# Patient Record
Sex: FEMALE | Race: BLACK OR AFRICAN AMERICAN | NOT HISPANIC OR LATINO | Employment: OTHER | URBAN - METROPOLITAN AREA
[De-identification: names, ages, dates, MRNs, and addresses within clinical notes are randomized per-mention and may not be internally consistent; named-entity substitution may affect disease eponyms.]

---

## 2017-01-28 ENCOUNTER — HOSPITAL ENCOUNTER (EMERGENCY)
Facility: HOSPITAL | Age: 47
Discharge: HOME | End: 2017-01-28
Attending: EMERGENCY MEDICINE
Payer: MEDICAID

## 2017-01-28 VITALS
SYSTOLIC BLOOD PRESSURE: 130 MMHG | DIASTOLIC BLOOD PRESSURE: 81 MMHG | HEIGHT: 69 IN | RESPIRATION RATE: 18 BRPM | OXYGEN SATURATION: 98 % | HEART RATE: 86 BPM | BODY MASS INDEX: 33.47 KG/M2 | WEIGHT: 226 LBS | TEMPERATURE: 98 F

## 2017-01-28 DIAGNOSIS — F32.A DEPRESSION, UNSPECIFIED DEPRESSION TYPE: ICD-10-CM

## 2017-01-28 DIAGNOSIS — R03.0 ELEVATED BLOOD PRESSURE READING: ICD-10-CM

## 2017-01-28 DIAGNOSIS — K08.89 PAIN, DENTAL: Primary | ICD-10-CM

## 2017-01-28 PROCEDURE — 99283 EMERGENCY DEPT VISIT LOW MDM: CPT

## 2017-01-28 RX ORDER — AMITRIPTYLINE HYDROCHLORIDE 100 MG/1
100 TABLET ORAL NIGHTLY
Qty: 30 TABLET | Refills: 0 | Status: SHIPPED | OUTPATIENT
Start: 2017-01-28 | End: 2017-05-26

## 2017-01-28 RX ORDER — HYDROCODONE BITARTRATE AND ACETAMINOPHEN 7.5; 325 MG/1; MG/1
1 TABLET ORAL EVERY 6 HOURS PRN
Qty: 16 TABLET | Refills: 0 | Status: SHIPPED | OUTPATIENT
Start: 2017-01-28 | End: 2017-04-22 | Stop reason: CLARIF

## 2017-01-28 NOTE — ED PROVIDER NOTES
"SCRIBE #1 NOTE: I, Anna Naqvi, am scribing for, and in the presence of, Teressa Castano DO. I have scribed the entire note.      History      Chief Complaint   Patient presents with    Dental Pain     pt states she had a tooth pulled this week and she feels like the pain has gotten worse and the infection has not improved since being on ABX       Review of patient's allergies indicates:   Allergen Reactions    Macrobid [nitrofurantoin monohyd/m-cryst] Hives        HPI   HPI    2017, 7:22 AM   History obtained from the patient      History of Present Illness: Sonali Harrison is a 46 y.o. female patient who presents to the Emergency Department for dental pain which onset suddenly after having a tooth pulled this week. Pt reports being given penicillin and Norco, for pain. Pt says she is out of her Norco. Symptoms are constant and moderate in severity. Pt currently rates the pain as a 9/10. The patient describes the sxs as a "throbbing" pain. No mitigating or exacerbating factors reported. No associated sxs reported. Patient denies any facial swelling, swelling of the throat, trouble swallowing chest pain, fever, chills, n/v/d, and all other sxs at this time. No other prior Tx reported. No further complaints or concerns at this time.         Arrival mode:Personal vehicle     PCP: Primary Doctor No       Past Medical History:  Past Medical History   Diagnosis Date    Arthritis     Depression     Diabetes mellitus     GERD (gastroesophageal reflux disease)     Hypertension     Insomnia     Panic attack        Past Surgical History:  Past Surgical History   Procedure Laterality Date    Hysterectomy       section           Family History:  Family History   Problem Relation Age of Onset    Birth defects Neg Hx        Social History:  Social History     Social History Main Topics    Smoking status: Current Every Day Smoker     Packs/day: 0.25     Types: Cigarettes    Smokeless " tobacco: Never Used    Alcohol use No    Drug use: No    Sexual activity: Unknown       ROS   Review of Systems   Constitutional: Negative for chills and fever.   HENT: Positive for dental problem (pain). Negative for facial swelling, sore throat and trouble swallowing.         (-) pharyngeal swelling   Respiratory: Negative for shortness of breath.    Cardiovascular: Negative for chest pain.   Gastrointestinal: Negative for diarrhea, nausea and vomiting.   Genitourinary: Negative for dysuria.   Musculoskeletal: Negative for back pain.   Skin: Negative for rash.   Neurological: Negative for weakness.   Hematological: Does not bruise/bleed easily.   All other systems reviewed and are negative.      Physical Exam    Initial Vitals   BP Pulse Resp Temp SpO2   01/28/17 0709 01/28/17 0709 01/28/17 0709 01/28/17 0709 01/28/17 0709   130/81 86 18 98.2 °F (36.8 °C) 98 %      Physical Exam  Nursing Notes and Vital Signs Reviewed.  Constitutional: Patient is in no acute distress. Awake and alert. Well-developed and well-nourished.  Head: Atraumatic. Normocephalic.  Eyes: PERRL. EOM intact. Conjunctivae are not pale. No scleral icterus.  ENT: Mucous membranes are moist. Oropharynx is clear and symmetric.  Mouth/Throat: No evident facial swelling. Healing gum noted to lower left second molar. Left upper molar TTP with erythema. No palpable fluctuance. No evidence of periodontal or periapical abscess. No trismus.     Neck: Supple. Full ROM. No lymphadenopathy.  Cardiovascular: Regular rate. Regular rhythm. No murmurs, rubs, or gallops. Distal pulses are 2+ and symmetric.  Pulmonary/Chest: No respiratory distress. Clear to auscultation bilaterally. No wheezing, rales, or rhonchi.  Abdominal: Soft and non-distended.  There is no tenderness.  No rebound, guarding, or rigidity.    Musculoskeletal: Moves all extremities. No obvious deformities. No edema. No calf tenderness.  Skin: Warm and dry.  Neurological:  Alert, awake, and  "appropriate.  Normal speech.  No acute focal neurological deficits are appreciated.  Psychiatric: Normal affect. Good eye contact. Appropriate in content.    ED Course    Procedures  ED Vital Signs:  Vitals:    01/28/17 0709   BP: 130/81   Pulse: 86   Resp: 18   Temp: 98.2 °F (36.8 °C)   TempSrc: Oral   SpO2: 98%   Weight: 102.5 kg (226 lb)   Height: 5' 9" (1.753 m)                The Emergency Provider reviewed the vital signs and test results, which are outlined above.    ED Discussion     7:32 AM: Initial assessment.  Pt is awake, alert, and in NAD at this time. Discussed with pt all pertinent ED information and results. Discussed pt dx and plan of tx. Gave pt all f/u and return to the ED instructions. All questions and concerns were addressed at this time. Pt expresses understanding of information and instructions, and is comfortable with plan to discharge. Pt is stable for discharge.    Pre-hypertension/Hypertension: The pt has been informed that they may have pre-hypertension or hypertension based on a blood pressure reading in the ED. I recommend that the pt call the PCP listed on their discharge instructions or a physician of their choice this week to arrange f/u for further evaluation of possible pre-hypertension or hypertension.       ED Medication(s):  Medications - No data to display    Discharge Medication List as of 1/28/2017  7:30 AM      START taking these medications    Details   hydrocodone-acetaminophen 7.5-325mg (NORCO) 7.5-325 mg per tablet Take 1 tablet by mouth every 6 (six) hours as needed for Pain., Starting 1/28/2017, Until Discontinued, Print             Follow-up Information     Follow up with Primary Care Provider of Choice. Schedule an appointment as soon as possible for a visit in 2 days.    Why:  For elevated blood pressure screening.  Return to emergency department for weakness, chest pain, chest pressure, difficult to breathing, or other concerns.    Contact information:    " [phone] for appointment.         Follow up with Your Dentist. Schedule an appointment as soon as possible for a visit in 2 days.    Why:  Return to the ED for:   throat swelling, facial swelling, difficulty breathing.             Medical Decision Making              Scribe Attestation:   Scribe #1: I performed the above scribed service and the documentation accurately describes the services I performed. I attest to the accuracy of the note.    Attending:   Physician Attestation Statement for Scribe #1: I, Teressa Castano DO, personally performed the services described in this documentation, as scribed by Anna Naqvi, in my presence, and it is both accurate and complete.          Clinical Impression       ICD-10-CM ICD-9-CM   1. Pain, dental K08.89 525.9   2. Elevated blood pressure reading R03.0 796.2   3. Depression, unspecified depression type F32.9 311       Disposition:   Disposition: Discharged  Condition: Stable         Teressa Castano DO  01/28/17 1921

## 2017-01-28 NOTE — ED AVS SNAPSHOT
[facility]  [address] [address] [address]               Sonali Harrison   2017  7:03 AM   ED    Description:  Female : 1970   Department:  [facility]            Your Care was Coordinated By:     Provider Role From To    Teressa Castano DO Attending Provider 17 0710 --      Reason for Visit     Dental Pain           Diagnoses this Visit        Comments    Pain, dental    -  Primary     Elevated blood pressure reading           ED Disposition     None           To Do List           Follow-up Information     Follow up with Primary Care Provider of Choice. Schedule an appointment as soon as possible for a visit in 2 days.    Why:  For elevated blood pressure screening.  Return to emergency department for weakness, chest pain, chest pressure, difficult to breathing, or other concerns.    Contact information:    [phone] for appointment.         Follow up with Your Dentist. Schedule an appointment as soon as possible for a visit in 2 days.    Why:  Return to the ED for:   throat swelling, facial swelling, difficulty breathing.        These Medications        Disp Refills Start End    hydrocodone-acetaminophen 7.5-325mg (NORCO) 7.5-325 mg per tablet 16 tablet 0 2017     Take 1 tablet by mouth every 6 (six) hours as needed for Pain. - Oral      [facility]     [facility] -  Assistance  Registered nurses in the [facility] provide clinical advisement, health education, appointment booking, and other advisory services.  Call for this free service at [phone].             Medications           Message regarding Medications     Verify the changes and/or additions to your medication regime listed below are the same as discussed with your clinician today.  If any of these changes or additions are incorrect, please notify your healthcare provider.        START taking these  NEW medications        Refills    hydrocodone-acetaminophen 7.5-325mg (NORCO) 7.5-325 mg per tablet 0    Sig: Take 1 tablet by mouth every 6 (six) hours as needed for Pain.    Class: Print    Route: Oral      STOP taking these medications     naproxen (NAPROSYN) 500 MG tablet Take 1 tablet (500 mg total) by mouth 2 (two) times daily with meals.    hydrocodone-acetaminophen 5-325mg (NORCO) 5-325 mg per tablet Take 1 tablet by mouth every 4 (four) hours as needed for Pain.    acetaminophen-codeine 300-30mg (TYLENOL #3) 300-30 mg Tab Take 1-2 tablets by mouth every 6 (six) hours as needed.    diclofenac (VOLTAREN) 50 MG EC tablet Take 1 tablet (50 mg total) by mouth 3 (three) times daily as needed.    etodolac (LODINE) 400 MG tablet Take 1 tablet (400 mg total) by mouth 2 (two) times daily.           Verify that the below list of medications is an accurate representation of the medications you are currently taking.  If none reported, the list may be blank. If incorrect, please contact your healthcare provider. Carry this list with you in case of emergency.           Current Medications     amitriptyline (ELAVIL) 100 MG tablet Take 1 tablet (100 mg total) by mouth every evening.    clonazePAM (KLONOPIN) 1 MG tablet Take 1 tablet (1 mg total) by mouth 2 (two) times daily as needed for Anxiety.    fluticasone (FLONASE) 50 mcg/actuation nasal spray 1 spray by Each Nare route 2 (two) times daily as needed.    hydrocodone-acetaminophen 7.5-325mg (NORCO) 7.5-325 mg per tablet Take 1 tablet by mouth every 6 (six) hours as needed for Pain.    meloxicam (MOBIC) 15 MG tablet Take 1 tablet (15 mg total) by mouth once daily.    metformin (GLUCOPHAGE) 500 MG tablet Take 1 tablet (500 mg total) by mouth daily with breakfast.    omeprazole (PRILOSEC) 20 MG capsule Take 1 capsule (20 mg total) by mouth once daily.           Clinical Reference Information           Your Vitals Were     BP Pulse Temp Resp Height Weight    130/81 (BP  "Location: Right arm, Patient Position: Sitting) 86 98.2 °F (36.8 °C) (Oral) 18 5' 9" (1.753 m) 102.5 kg (226 lb)    SpO2 BMI             98% 33.37 kg/m2         Allergies as of 1/28/2017        Reactions    Macrobid [Nitrofurantoin Monohyd/m-cryst] Hives      Immunizations Administered on Date of Encounter - 1/28/2017     None      ED Micro, Lab, POCT     None      ED Imaging Orders     None        Discharge Instructions       Your blood pressure was elevated in the ED.  You may have high blood pressure.   Keep a log of your blood pressure and follow up with a Primary Care Provider within the next two weeks. Call [phone] for appointment.       Discharge References/Attachments     HYPERTENSION, TO BE CONFIRMED (ENGLISH)    DENTAL PAIN (ENGLISH)    HYDROCODONE BITARTRATE, ACETAMINOPHEN ORAL TABLET (ENGLISH)      [facility] Sign-Up     Activating your [facility] account is as easy as 1-2-3!     1) Visit [email or web address], select Sign Up Now, enter this activation code and your date of birth, then select Next.  [email or web address]  Expires: 3/14/2017  7:30 AM      2) Create a username and password to use when you visit [facility] in the future and select a security question in case you lose your password and select Next.    3) Enter your e-mail address and click Sign Up!    Additional Information  If you have questions, please e-mail [email or web address] or call [phone] to talk to our [facility] staff. Remember, [facility] is NOT to be used for urgent needs. For medical emergencies, dial 911.         Smoking Cessation     If you would like to quit smoking:   You may be eligible for free services if you are a [place] resident and started smoking cigarettes before September 1, 1988.  Call the [employer]oking [employer]essation [employer] ([employer]) toll free at (347) 562-1164 or (083) 146-5237.   Call 4-272-QUIT-NOW if you do not meet the above criteria.             [facility]  complies with applicable Federal civil rights laws " and does not discriminate on the basis of race, color, national origin, age, disability, or sex.        Language Assistance Services     ATTENTION: Language assistance services are available, free of charge. Please call [phone].      ATENCIÓN: Si habla obey, tiene a mantilla disposición servicios gratuitos de asistencia lingüística. Llame al [phone].     CHÚ Ý: N?u b?n nói Ti?ng Vi?t, có các d?ch v? h? tr? ngôn ng? mi?n phí dành cho b?n. G?i s? [phone].

## 2017-01-28 NOTE — DISCHARGE INSTRUCTIONS
Your blood pressure was elevated in the ED.  You may have high blood pressure.   Keep a log of your blood pressure and follow up with a Primary Care Provider within the next two weeks. Call [phone] for appointment.

## 2017-01-28 NOTE — ED NOTES
Bed: RWR 02  Expected date:   Expected time:   Means of arrival:   Comments:     Teressa Castano,   01/28/17 0731

## 2017-04-22 ENCOUNTER — HOSPITAL ENCOUNTER (EMERGENCY)
Facility: HOSPITAL | Age: 47
Discharge: HOME | End: 2017-04-22
Payer: MEDICAID

## 2017-04-22 VITALS
RESPIRATION RATE: 16 BRPM | HEIGHT: 70 IN | DIASTOLIC BLOOD PRESSURE: 68 MMHG | WEIGHT: 188 LBS | BODY MASS INDEX: 26.92 KG/M2 | OXYGEN SATURATION: 99 % | TEMPERATURE: 98 F | HEART RATE: 75 BPM | SYSTOLIC BLOOD PRESSURE: 117 MMHG

## 2017-04-22 DIAGNOSIS — M27.3 DRY SOCKET: ICD-10-CM

## 2017-04-22 DIAGNOSIS — K08.89 PAIN, DENTAL: Primary | ICD-10-CM

## 2017-04-22 PROCEDURE — 99283 EMERGENCY DEPT VISIT LOW MDM: CPT

## 2017-04-22 RX ORDER — HYDROCODONE BITARTRATE AND ACETAMINOPHEN 10; 325 MG/1; MG/1
1 TABLET ORAL EVERY 6 HOURS PRN
Qty: 10 TABLET | Refills: 0 | Status: SHIPPED | OUTPATIENT
Start: 2017-04-22 | End: 2017-05-26 | Stop reason: SDUPTHER

## 2017-04-22 RX ORDER — IBUPROFEN 600 MG/1
600 TABLET ORAL EVERY 6 HOURS PRN
Qty: 10 TABLET | Refills: 0 | Status: SHIPPED | OUTPATIENT
Start: 2017-04-22 | End: 2017-06-26

## 2017-04-22 NOTE — DISCHARGE INSTRUCTIONS
Follow up with dentist as discussed. See if you can be seen on Monday.   Pain medication as prescribed. Do not take anything over the counter for pain while taking these medications.         Dry Socket    Dry socket occurs after a tooth is removed (extracted). After a tooth is removed, a blood clot forms in the space where the tooth was. This clot protects the underlying bone until the gum has healed. Dry socket occurs when the blood clot dissolves or falls out too soon, exposing the bone and nerves. This may cause severe pain, which can extend to the jaw or other parts of the face and head. Symptoms usually occur 1 to 3 days after surgery. Dry socket is more likely if the extraction was difficult. Infection also makes dry socket more likely. Smoking or taking birth control pills can also increase the risk.  Home care  Medicines: The healthcare provider may prescribe medicine for pain or infection. Follow the healthcare providers instructions when using these medicines. If you are given medicine for infection, take it exactly as directed. Do not stop taking it until you are told to.  General care  · If the socket was packed with gauze, follow the healthcare providers instructions to care for it. Follow up with your oral surgeon or dentist to have the gauze changed.  · Rinse your mouth with saltwater or a prescribed mouthwash a few times a day. This flushes food particles from the socket. You may be given a syringe to help flush the socket. If this is the case, follow the healthcare providers instructions closely.  · Place a cold pack wrapped in a thin towel on your jaw over the sore area for 10 minutes at a time to help reduce pain and swelling.  · Avoid drinking from a straw. This can make the pain worse.  · Avoid foods that are hard and may poke the socket. Avoid hot or cold food and drinks until the socket heals.  Follow-up care  Follow up as directed with your oral surgeon or dentist. Often, your surgeon or  dentist will pack the socket until healing is complete. Your pain may go away with the treatment given, but only an oral surgeon or dentist can fully evaluate and treat your dry socket.  · If a culture was done, you will be notified if the treatment needs to be changed. You can call as directed for the results.  · If X-rays were done, they will be reviewed. You will be notified if there is a change in the reading, especially if it affects treatment.  Call 911  Call emergency services right away if any of these occur:  · Trouble breathing or swallowing, wheezing  · Hoarse voice or trouble speaking  · Confused  · Extreme drowsiness or trouble awakening  · Fainting or loss of consciousness  · Rapid heart rate  When to seek medical advice  Call your healthcare provider right away if any of these occur:  · Increased swelling and redness of the face  · Pain that worsens or spreads to the neck or ear  · Fever of 100.4°F (38°C) or higher  · Unusual drowsiness, headache or stiff neck, weakness  · Pus draining from the tooth socket  · Bleeding that is severe or wont stop with pressure on the area.  · Unpleasant smell and taste in your mouth  Date Last Reviewed: 7/30/2015  © 4489-4119 [employer]. [address] [place] [address], [place] [address]. All rights reserved. This information is not intended as a substitute for professional medical care. Always follow your healthcare professional's instructions.

## 2017-04-22 NOTE — ED AVS SNAPSHOT
[facility]  [address] [facility] [address]               Sonali Harrison   2017  8:39 AM   ED    Description:  Female : 1970   Department:  [facility]            Your Care was Coordinated By:     Provider Role From To    Nasima Contreras PA-C Physician Assistant 17 0839 17 0916    Nasima Contreras PA-C Physician Assistant 17 0920 --      Reason for Visit     Dental Pain           Diagnoses this Visit        Comments    Pain, dental    -  Primary     Dry socket           ED Disposition     ED Disposition Condition Comment    Discharge             To Do List           Follow-up Information     Follow up with [facility] In 2 days.    Contact information:    [facility] [facility] [address] [phone]         These Medications        Disp Refills Start End    hydrocodone-acetaminophen 10-325mg (NORCO)  mg Tab 10 tablet 0 2017     Take 1 tablet by mouth every 6 (six) hours as needed for Pain. - Oral    ibuprofen (ADVIL,MOTRIN) 600 MG tablet 10 tablet 0 2017     Take 1 tablet (600 mg total) by mouth every 6 (six) hours as needed for Pain. - Oral      [facility]     [facility] - 24/ Assistance  Unless otherwise directed by your provider, please contact [facility], our nurse care line that is available for 24/7 assistance.     Registered nurses in the [facility] provide: appointment scheduling, clinical advisement, health education, and other advisory services.  Call: [phone] (toll free)               Medications           Message regarding Medications     Verify the changes and/or additions to your medication regime listed below are the same as discussed with your clinician today.  If any of these changes or additions are incorrect, please notify your healthcare provider.        START taking these NEW  "medications        Refills    hydrocodone-acetaminophen 10-325mg (NORCO)  mg Tab 0    Sig: Take 1 tablet by mouth every 6 (six) hours as needed for Pain.    Class: Print    Route: Oral    ibuprofen (ADVIL,MOTRIN) 600 MG tablet 0    Sig: Take 1 tablet (600 mg total) by mouth every 6 (six) hours as needed for Pain.    Class: Print    Route: Oral      STOP taking these medications     hydrocodone-acetaminophen 7.5-325mg (NORCO) 7.5-325 mg per tablet Take 1 tablet by mouth every 6 (six) hours as needed for Pain.           Verify that the below list of medications is an accurate representation of the medications you are currently taking.  If none reported, the list may be blank. If incorrect, please contact your healthcare provider. Carry this list with you in case of emergency.           Current Medications     amitriptyline (ELAVIL) 100 MG tablet Take 1 tablet (100 mg total) by mouth every evening.    clonazePAM (KLONOPIN) 1 MG tablet Take 1 tablet (1 mg total) by mouth 2 (two) times daily as needed for Anxiety.    fluticasone (FLONASE) 50 mcg/actuation nasal spray 1 spray by Each Nare route 2 (two) times daily as needed.    hydrocodone-acetaminophen 10-325mg (NORCO)  mg Tab Take 1 tablet by mouth every 6 (six) hours as needed for Pain.    ibuprofen (ADVIL,MOTRIN) 600 MG tablet Take 1 tablet (600 mg total) by mouth every 6 (six) hours as needed for Pain.    meloxicam (MOBIC) 15 MG tablet Take 1 tablet (15 mg total) by mouth once daily.    metformin (GLUCOPHAGE) 500 MG tablet Take 1 tablet (500 mg total) by mouth daily with breakfast.    omeprazole (PRILOSEC) 20 MG capsule Take 1 capsule (20 mg total) by mouth once daily.           Clinical Reference Information           Your Vitals Were     BP Pulse Temp Resp Height Weight    117/68 (BP Location: Right arm, Patient Position: Sitting) 75 97.9 °F (36.6 °C) (Oral) 16 5' 10" (1.778 m) 85.3 kg (188 lb)    SpO2 BMI             99% 26.98 kg/m2         Allergies " as of 4/22/2017        Reactions    Macrobid [Nitrofurantoin Monohyd/m-cryst] Hives      Immunizations Administered on Date of Encounter - 4/22/2017     None      ED Micro, Lab, POCT     None      ED Imaging Orders     None        Discharge Instructions       Follow up with dentist as discussed. See if you can be seen on Monday.   Pain medication as prescribed. Do not take anything over the counter for pain while taking these medications.         Dry Socket    Dry socket occurs after a tooth is removed (extracted). After a tooth is removed, a blood clot forms in the space where the tooth was. This clot protects the underlying bone until the gum has healed. Dry socket occurs when the blood clot dissolves or falls out too soon, exposing the bone and nerves. This may cause severe pain, which can extend to the jaw or other parts of the face and head. Symptoms usually occur 1 to 3 days after surgery. Dry socket is more likely if the extraction was difficult. Infection also makes dry socket more likely. Smoking or taking birth control pills can also increase the risk.  Home care  Medicines: The healthcare provider may prescribe medicine for pain or infection. Follow the healthcare providers instructions when using these medicines. If you are given medicine for infection, take it exactly as directed. Do not stop taking it until you are told to.  General care  · If the socket was packed with gauze, follow the healthcare providers instructions to care for it. Follow up with your oral surgeon or dentist to have the gauze changed.  · Rinse your mouth with saltwater or a prescribed mouthwash a few times a day. This flushes food particles from the socket. You may be given a syringe to help flush the socket. If this is the case, follow the healthcare providers instructions closely.  · Place a cold pack wrapped in a thin towel on your jaw over the sore area for 10 minutes at a time to help reduce pain and swelling.  · Avoid  drinking from a straw. This can make the pain worse.  · Avoid foods that are hard and may poke the socket. Avoid hot or cold food and drinks until the socket heals.  Follow-up care  Follow up as directed with your oral surgeon or dentist. Often, your surgeon or dentist will pack the socket until healing is complete. Your pain may go away with the treatment given, but only an oral surgeon or dentist can fully evaluate and treat your dry socket.  · If a culture was done, you will be notified if the treatment needs to be changed. You can call as directed for the results.  · If X-rays were done, they will be reviewed. You will be notified if there is a change in the reading, especially if it affects treatment.  Call 911  Call emergency services right away if any of these occur:  · Trouble breathing or swallowing, wheezing  · Hoarse voice or trouble speaking  · Confused  · Extreme drowsiness or trouble awakening  · Fainting or loss of consciousness  · Rapid heart rate  When to seek medical advice  Call your healthcare provider right away if any of these occur:  · Increased swelling and redness of the face  · Pain that worsens or spreads to the neck or ear  · Fever of 100.4°F (38°C) or higher  · Unusual drowsiness, headache or stiff neck, weakness  · Pus draining from the tooth socket  · Bleeding that is severe or wont stop with pressure on the area.  · Unpleasant smell and taste in your mouth  Date Last Reviewed: 7/30/2015  © 7114-4926 [employer]. [address] [place] [address]. All rights reserved. This information is not intended as a substitute for professional medical care. Always follow your healthcare professional's instructions.          [facility] Sign-Up     Activating your [facility] account is as easy as 1-2-3!     1) Visit [email or web address], select Sign Up Now, enter this activation code and your date of birth, then select Next.  [email or web address]  Expires: 6/6/2017  9:34 AM      2)  Create a username and password to use when you visit [facility] in the future and select a security question in case you lose your password and select Next.    3) Enter your e-mail address and click Sign Up!    Additional Information  If you have questions, please e-mail [email or web address] or call [phone] to talk to our [employer] staff. Remember, [facility] is NOT to be used for urgent needs. For medical emergencies, dial 911.          [facility] complies with applicable Federal civil rights laws and does not discriminate on the basis of race, color, national origin, age, disability, or sex.        Language Assistance Services     ATTENTION: Language assistance services are available, free of charge. Please call [phone].      ATENCIÓN: Si habla español, tiene a mantilla disposición servicios gratuitos de asistencia lingüística. Llame al [phone].     CHÚ Ý: N?u b?n nói Ti?ng Vi?t, có các d?ch v? h? tr? ngôn ng? mi?n phí dành cho b?n. G?i s? [phone].

## 2017-04-22 NOTE — ED PROVIDER NOTES
Encounter Date: 2017       History     Chief Complaint   Patient presents with    Dental Pain     pt had tooth pulled on 17, pt reports still has pain, also reports sore throat.     Review of patient's allergies indicates:   Allergen Reactions    Macrobid [nitrofurantoin monohyd/m-cryst] Hives     HPI Comments: Patient reports pain to area where tooth was pulled on Monday. Patient was seen at [facility]. She tried to go back Friday but they don't take walkins on . She has another appointment with them in May for another tooth to be pulled. She comes in today for throbbing pain to area. States it feels like the last time when she had a dry socket. She is already on PCN and Norco 7.5 without relief of complaints. No facial swelling or fever. Patient states she is going to go by Monday to see if she can see the dentist again but can't take the pain until that time. Difficult to sleep due to pain.      Past Medical History:   Diagnosis Date    Arthritis     Depression     Diabetes mellitus     GERD (gastroesophageal reflux disease)     Hypertension     Insomnia     Panic attack      Past Surgical History:   Procedure Laterality Date     SECTION      HYSTERECTOMY       Family History   Problem Relation Age of Onset    Birth defects Neg Hx      Social History   Substance Use Topics    Smoking status: Current Every Day Smoker     Packs/day: 0.25     Types: Cigarettes    Smokeless tobacco: Never Used    Alcohol use No     Review of Systems   Constitutional: Negative.    HENT: Positive for dental problem. Negative for facial swelling.    Respiratory: Negative.    Cardiovascular: Negative.    Genitourinary: Negative.    All other systems reviewed and are negative.      Physical Exam   Initial Vitals   BP Pulse Resp Temp SpO2   17 0826 17 0826 17 0826 17 0826 17 0826   117/68 75 16 97.9 °F (36.6 °C) 99 %     Physical Exam    Nursing note and vitals  reviewed.  Constitutional: She appears well-developed.   Patient tearful. Appears tired and to be in pain.     HENT:   Head: Normocephalic and atraumatic.   S/P removal of tooth #28 - gumline not erythematous. No drainage.  Area very sensitive to touch.  No associated facial swelling.     Neck: Neck supple.   Cardiovascular: Normal rate and regular rhythm.   Pulmonary/Chest: Breath sounds normal. No respiratory distress.   Lymphadenopathy:     She has no cervical adenopathy.   Neurological: She is alert and oriented to person, place, and time.   Skin: Skin is warm and dry. No rash noted.   Psychiatric:   Tearful but consolable.           ED Course   Procedures  Labs Reviewed - No data to display                       Attending Attestation:     Physician Attestation Statement for NP/PA:   I discussed this assessment and plan of this patient with the NP/PA, but I did not personally examine the patient. The face to face encounter was performed by the NP/PA.                  ED Course     Clinical Impression:   The primary encounter diagnosis was Pain, dental. A diagnosis of Dry socket was also pertinent to this visit.    Disposition:   Disposition: Discharged  Condition: Stable    Advised patient to try to follow up with dentist on Monday. No other otc medications for pain while taking prescriptions. Continue penicillin. Re-evaluation with facial swelling, fever, drainage, worsening complaints.          Nasima Contreras PA-C  04/22/17 4715       Rj Merlos MD  04/22/17 4276

## 2017-05-26 ENCOUNTER — HOSPITAL ENCOUNTER (EMERGENCY)
Facility: HOSPITAL | Age: 47
Discharge: HOME | End: 2017-05-26
Attending: EMERGENCY MEDICINE
Payer: MEDICAID

## 2017-05-26 VITALS
HEIGHT: 71 IN | SYSTOLIC BLOOD PRESSURE: 137 MMHG | DIASTOLIC BLOOD PRESSURE: 69 MMHG | OXYGEN SATURATION: 99 % | BODY MASS INDEX: 27.16 KG/M2 | WEIGHT: 194 LBS | HEART RATE: 73 BPM | RESPIRATION RATE: 18 BRPM | TEMPERATURE: 98 F

## 2017-05-26 DIAGNOSIS — K04.7 DENTAL ABSCESS: ICD-10-CM

## 2017-05-26 DIAGNOSIS — F41.9 ANXIETY: ICD-10-CM

## 2017-05-26 DIAGNOSIS — K08.89 TOOTHACHE: Primary | ICD-10-CM

## 2017-05-26 PROCEDURE — 99283 EMERGENCY DEPT VISIT LOW MDM: CPT

## 2017-05-26 RX ORDER — CLINDAMYCIN HYDROCHLORIDE 150 MG/1
450 CAPSULE ORAL EVERY 8 HOURS
Qty: 45 CAPSULE | Refills: 0 | Status: SHIPPED | OUTPATIENT
Start: 2017-05-26 | End: 2017-05-31

## 2017-05-26 RX ORDER — CLONAZEPAM 1 MG/1
1 TABLET ORAL 2 TIMES DAILY PRN
Qty: 15 TABLET | Refills: 0 | Status: SHIPPED | OUTPATIENT
Start: 2017-05-26 | End: 2017-06-13 | Stop reason: SDUPTHER

## 2017-05-26 RX ORDER — AMITRIPTYLINE HYDROCHLORIDE 100 MG/1
100 TABLET ORAL NIGHTLY
Qty: 30 TABLET | Refills: 0 | Status: SHIPPED | OUTPATIENT
Start: 2017-05-26 | End: 2024-01-31

## 2017-05-26 RX ORDER — HYDROCODONE BITARTRATE AND ACETAMINOPHEN 5; 325 MG/1; MG/1
1 TABLET ORAL EVERY 4 HOURS PRN
Qty: 11 TABLET | Refills: 0 | Status: SHIPPED | OUTPATIENT
Start: 2017-05-26 | End: 2017-06-05

## 2017-05-26 NOTE — ED PROVIDER NOTES
SCRIBE #1 NOTE: I, Jonathan Sharp, am scribing for, and in the presence of, Frank Contreras MD. I have scribed the entire note.      History      Chief Complaint   Patient presents with    Joint Pain     hx of arthritis (pain to arms and legs)    Dental Pain       Review of patient's allergies indicates:   Allergen Reactions    Macrobid [nitrofurantoin monohyd/m-cryst] Hives        HPI   HPI    2017, 9:53 AM   History obtained from the patient      History of Present Illness: Sonali Harrison is a 47 y.o. female patient who presents to the Emergency Department for generalized arthralgias which onset gradually a few days ago. Pt states she has arthritis. Symptoms are intermittent and moderate in severity. Sx are exacerbated by nothing and relieved by nothing. Associated sxs include L upper gum pain. Pt reports having a tooth pulled 7 days ago and reports seeing her dentist 3 days ago who told her she had a dry socket and was placed on penicillin. No other sxs reported. Pt is also requesting a refill on her klonopin. Patient denies any fever, N/V/D, chills, abd pain, drooling, facial swelling, difficulty swallowing, rhinorrhea, congestion, cough, joint swelling, recent injury/trauma, ecchymosis, erythema and all other sxs at this time. No further complaints or concerns at this time.     Arrival mode: Personal vehicle     PCP: Primary Doctor No       Past Medical History:  Past Medical History:   Diagnosis Date    Arthritis     Depression     Diabetes mellitus     GERD (gastroesophageal reflux disease)     Hypertension     Insomnia     Panic attack        Past Surgical History:  Past Surgical History:   Procedure Laterality Date     SECTION      HYSTERECTOMY           Family History:  Family History   Problem Relation Age of Onset    Birth defects Neg Hx        Social History:  Social History     Social History Main Topics    Smoking status: Current Every Day Smoker     Packs/day:  0.25     Types: Cigarettes    Smokeless tobacco: Never Used    Alcohol use No    Drug use: No    Sexual activity: Unknown       ROS   Review of Systems   Constitutional: Negative for chills and fever.   HENT: Positive for dental problem (gum pain). Negative for congestion, drooling, facial swelling, rhinorrhea, sinus pressure, sore throat and trouble swallowing.    Respiratory: Negative for cough, chest tightness and shortness of breath.    Cardiovascular: Negative for chest pain.   Gastrointestinal: Negative for abdominal pain, nausea and vomiting.   Musculoskeletal: Positive for arthralgias (generalized). Negative for back pain, joint swelling and neck pain.   Skin: Negative for rash.   Neurological: Negative for dizziness, numbness and headaches.   Psychiatric/Behavioral: Negative for agitation and confusion.   All other systems reviewed and are negative.      Physical Exam      Initial Vitals [05/26/17 0944]   BP Pulse Resp Temp SpO2   137/69 73 18 98 °F (36.7 °C) 99 %      Physical Exam  Nursing Notes and Vital Signs Reviewed.  Constitutional: Patient is in no apparent distress. Well-developed and well-nourished.  Head: Atraumatic. Normocephalic.  Eyes: PERRL. EOM intact. Conjunctivae are not pale. No scleral icterus.  ENT: Mucous membranes are moist. Oropharynx is clear and symmetric. Poor dentition, multiple missing teeth noted. Tooth number 9 has gum swelling and erythema.   Neck: Supple. Full ROM. No lymphadenopathy.  Cardiovascular: Regular rate. Regular rhythm. No murmurs, rubs, or gallops. Distal pulses are 2+ and symmetric.  Pulmonary/Chest: No respiratory distress. Clear to auscultation bilaterally. No wheezing, rales, or rhonchi.  Abdominal: Soft and non-distended.  There is no tenderness.  No rebound, guarding, or rigidity. Good bowel sounds.  Musculoskeletal: Moves all extremities. No obvious deformities. No edema. No calf tenderness.  Skin: Warm and dry.  Neurological:  Alert, awake, and  "appropriate.  Normal speech.  No acute focal neurological deficits are appreciated.  Psychiatric: Normal affect. Good eye contact. Appropriate in content.    ED Course    Procedures  ED Vital Signs:  Vitals:    05/26/17 0944   BP: 137/69   Pulse: 73   Resp: 18   Temp: 98 °F (36.7 °C)   TempSrc: Oral   SpO2: 99%   Weight: 88 kg (194 lb)   Height: 5' 11" (1.803 m)       Abnormal Lab Results:  Labs Reviewed - No data to display               The Emergency Provider reviewed the vital signs and test results, which are outlined above.    ED Discussion     9:59 AM: Pt is awake, alert, and oriented. Discussed with pt all pertinent ED information and results. Discussed pt dx and plan of tx. Gave pt all f/u and return to the ED instructions. All questions and concerns were addressed at this time. Pt expresses understanding of information and instructions, and is comfortable with plan to discharge. Pt is stable for discharge.            ED Medication(s):  Medications - No data to display    Discharge Medication List as of 5/26/2017  9:56 AM      START taking these medications    Details   clindamycin (CLEOCIN) 150 MG capsule Take 3 capsules (450 mg total) by mouth every 8 (eight) hours., Starting Fri 5/26/2017, Until Wed 5/31/2017, Print      hydrocodone-acetaminophen 5-325mg (NORCO) 5-325 mg per tablet Take 1 tablet by mouth every 4 (four) hours as needed for Pain., Starting Fri 5/26/2017, Until Mon 6/5/2017, Print             Follow-up Information     Dentist in 3 days.           [facility] in 3 days.    Contact information:  [address] [address] [phone]                     Medical Decision Making              Scribe Attestation:   Scribe #1: I performed the above scribed service and the documentation accurately describes the services I performed. I attest to the accuracy of the note.    Attending:   Physician Attestation Statement for Scribe #1: I, Frank Contreras MD, personally " performed the services described in this documentation, as scribed by Jonathan Sharp, in my presence, and it is both accurate and complete.          Clinical Impression       ICD-10-CM ICD-9-CM   1. Toothache K08.89 525.9   2. Dental abscess K04.7 522.5   3. Anxiety F41.9 300.00       Disposition:   Disposition: Discharged  Condition: Stable         Frank Contreras MD  05/26/17 1040

## 2017-06-13 ENCOUNTER — HOSPITAL ENCOUNTER (EMERGENCY)
Facility: HOSPITAL | Age: 47
Discharge: HOME | End: 2017-06-13
Attending: EMERGENCY MEDICINE
Payer: MEDICAID

## 2017-06-13 VITALS
OXYGEN SATURATION: 95 % | DIASTOLIC BLOOD PRESSURE: 66 MMHG | SYSTOLIC BLOOD PRESSURE: 139 MMHG | HEART RATE: 93 BPM | WEIGHT: 195 LBS | RESPIRATION RATE: 18 BRPM | TEMPERATURE: 98 F | HEIGHT: 71 IN | BODY MASS INDEX: 27.3 KG/M2

## 2017-06-13 DIAGNOSIS — R05.9 COUGH: ICD-10-CM

## 2017-06-13 DIAGNOSIS — M25.561 ACUTE PAIN OF BOTH KNEES: Primary | ICD-10-CM

## 2017-06-13 DIAGNOSIS — M25.562 ACUTE PAIN OF BOTH KNEES: Primary | ICD-10-CM

## 2017-06-13 DIAGNOSIS — F41.9 ANXIETY: ICD-10-CM

## 2017-06-13 PROCEDURE — 99282 EMERGENCY DEPT VISIT SF MDM: CPT

## 2017-06-13 RX ORDER — TRAMADOL HYDROCHLORIDE 50 MG/1
50 TABLET ORAL EVERY 6 HOURS PRN
Qty: 12 TABLET | Refills: 0 | OUTPATIENT
Start: 2017-06-13 | End: 2019-11-11

## 2017-06-13 RX ORDER — CLONAZEPAM 1 MG/1
1 TABLET ORAL 2 TIMES DAILY PRN
Qty: 12 TABLET | Refills: 0 | Status: SHIPPED | OUTPATIENT
Start: 2017-06-13

## 2017-06-13 RX ORDER — PROMETHAZINE HYDROCHLORIDE AND DEXTROMETHORPHAN HYDROBROMIDE 6.25; 15 MG/5ML; MG/5ML
SYRUP ORAL
Qty: 118 ML | Refills: 0 | Status: SHIPPED | OUTPATIENT
Start: 2017-06-13

## 2017-06-13 NOTE — ED PROVIDER NOTES
"Encounter Date: 2017       History     Chief Complaint   Patient presents with    Medication Refill     Pt states, "I have arthritis in my knees really bad and I'm out of my arthritis medication and my anxiety medication".     Review of patient's allergies indicates:   Allergen Reactions    Macrobid [nitrofurantoin monohyd/m-cryst] Hives     47 year old female with complaint of chronic bilateral knee pain with worsening since she ran out of Lortab.  Also reports out of Klonopin for anxiety.  Also reports cough and congestion X one week. No SOB.  No fever or chills.  Pain in knees worse with walking and movement.  Mild pain relief with rest.            Past Medical History:   Diagnosis Date    Arthritis     Depression     Diabetes mellitus     GERD (gastroesophageal reflux disease)     Hypertension     Insomnia     Panic attack      Past Surgical History:   Procedure Laterality Date     SECTION      HYSTERECTOMY       Family History   Problem Relation Age of Onset    Birth defects Neg Hx      Social History   Substance Use Topics    Smoking status: Current Every Day Smoker     Packs/day: 0.25     Types: Cigarettes    Smokeless tobacco: Never Used    Alcohol use No     Review of Systems   Constitutional: Negative for fever.   HENT: Negative for sore throat.    Respiratory: Negative for shortness of breath.    Cardiovascular: Negative for chest pain.   Gastrointestinal: Negative for nausea.   Genitourinary: Negative for dysuria.   Musculoskeletal: Negative for back pain.        Knee pain    Skin: Negative for rash.   Neurological: Negative for weakness.        Anxiety   Hematological: Does not bruise/bleed easily.       Physical Exam     Initial Vitals [17 0724]   BP Pulse Resp Temp SpO2   139/66 93 18 98.3 °F (36.8 °C) 95 %     Physical Exam    Nursing note and vitals reviewed.  Constitutional: She appears well-developed and well-nourished.   HENT:   Head: Normocephalic and " atraumatic.   Eyes: Conjunctivae and EOM are normal. Pupils are equal, round, and reactive to light.   Neck: Normal range of motion. Neck supple.   Cardiovascular: Normal rate, regular rhythm, normal heart sounds and intact distal pulses.   Pulmonary/Chest: Breath sounds normal.   Abdominal: Soft. There is no tenderness. There is no rebound and no guarding.   Musculoskeletal: Normal range of motion.   No knee swelling, pain with ROM of both knees, ambulates without difficulty   Neurological: She is alert and oriented to person, place, and time. She has normal strength and normal reflexes.   Skin: Skin is warm and dry.   Psychiatric: She has a normal mood and affect. Her behavior is normal. Thought content normal.         ED Course   Procedures  Labs Reviewed - No data to display                            ED Course     Clinical Impression:   The primary encounter diagnosis was Acute pain of both knees. Diagnoses of Cough and Anxiety were also pertinent to this visit.          Jayme Lugo NP  06/13/17 0871

## 2017-06-13 NOTE — ED NOTES
Pt examined by EWELINA Lugo np without RN, educated on prescriptions, given discharge instructions and discharged to [facility].  See provider notes for exam

## 2017-06-26 ENCOUNTER — HOSPITAL ENCOUNTER (EMERGENCY)
Facility: HOSPITAL | Age: 47
Discharge: HOME | End: 2017-06-26
Payer: MEDICAID

## 2017-06-26 VITALS
DIASTOLIC BLOOD PRESSURE: 67 MMHG | BODY MASS INDEX: 27.16 KG/M2 | RESPIRATION RATE: 20 BRPM | TEMPERATURE: 98 F | WEIGHT: 194 LBS | SYSTOLIC BLOOD PRESSURE: 131 MMHG | HEART RATE: 93 BPM | HEIGHT: 71 IN | OXYGEN SATURATION: 96 %

## 2017-06-26 DIAGNOSIS — M25.562 CHRONIC PAIN OF BOTH KNEES: Primary | ICD-10-CM

## 2017-06-26 DIAGNOSIS — G89.29 CHRONIC PAIN OF BOTH KNEES: Primary | ICD-10-CM

## 2017-06-26 DIAGNOSIS — M25.561 CHRONIC PAIN OF BOTH KNEES: Primary | ICD-10-CM

## 2017-06-26 PROCEDURE — 99283 EMERGENCY DEPT VISIT LOW MDM: CPT

## 2017-06-26 RX ORDER — MELOXICAM 7.5 MG/1
7.5 TABLET ORAL DAILY
Qty: 10 TABLET | Refills: 0 | Status: SHIPPED | OUTPATIENT
Start: 2017-06-26 | End: 2018-08-03

## 2017-06-26 RX ORDER — ORPHENADRINE CITRATE 100 MG/1
100 TABLET, EXTENDED RELEASE ORAL 2 TIMES DAILY
Qty: 12 TABLET | Refills: 0 | Status: SHIPPED | OUTPATIENT
Start: 2017-06-26

## 2017-06-27 NOTE — ED PROVIDER NOTES
"SCRIBE #1 NOTE: I, Driss Antolin, am scribing for, and in the presence of, Luisana Almeida PA-C  . I have scribed the entire note.      History      Chief Complaint   Patient presents with    Knee Pain     Pt states, "My knees are swelling and in pain."       Review of patient's allergies indicates:   Allergen Reactions    Macrobid [nitrofurantoin monohyd/m-cryst] Hives        HPI   HPI    2017, 7:10 PM   History obtained from the patient      History of Present Illness: Sonali Harrison is a 47 y.o. female patient w/ PMHx Arthritis presents to the Emergency Department for chronic bilateral knee pain which onset gradually a couple of years ago. Pt states that her doctor can no longer prescribe her narcotic pain medication, and that the pain has begun to worsen. Symptoms are constant and moderate in severity.  No mitigating or exacerbating factors reported. No associated sxs reported at this time. Patient denies any fever, chills, abd pain, neck pain, gait change, and all other sxs at this time. No prior tx reported. No further complaints or concerns at this time.         Arrival mode: Personal vehicle    PCP: Ev Rivera MD       Past Medical History:  Past Medical History:   Diagnosis Date    Arthritis     Depression     Diabetes mellitus     GERD (gastroesophageal reflux disease)     Hypertension     Insomnia     Panic attack        Past Surgical History:  Past Surgical History:   Procedure Laterality Date     SECTION      HYSTERECTOMY           Family History:  Family History   Problem Relation Age of Onset    Birth defects Neg Hx        Social History:  Social History     Social History Main Topics    Smoking status: Current Every Day Smoker     Packs/day: 0.25     Types: Cigarettes    Smokeless tobacco: Never Used    Alcohol use No    Drug use: No    Sexual activity: Unknown       ROS   Review of Systems   Constitutional: Negative for chills, diaphoresis and fever. "   HENT: Negative for sore throat.    Respiratory: Negative for shortness of breath.    Cardiovascular: Negative for chest pain, palpitations and leg swelling.   Gastrointestinal: Negative for abdominal pain, blood in stool, constipation, diarrhea, nausea and vomiting.   Genitourinary: Negative for difficulty urinating, dysuria, flank pain, frequency, hematuria and urgency.   Musculoskeletal: Negative for back pain, gait problem, neck pain and neck stiffness.        + bilateral knee pain   Skin: Negative for rash.   Neurological: Negative for dizziness, syncope, speech difficulty, weakness, light-headedness, numbness and headaches.   Hematological: Does not bruise/bleed easily.   All other systems reviewed and are negative.      Physical Exam      Initial Vitals [06/26/17 1820]   BP Pulse Resp Temp SpO2   131/67 93 20 98.3 °F (36.8 °C) 96 %      MAP       88.33          Physical Exam  Nursing Notes and Vital Signs Reviewed.  Constitutional: Patient is in no apparent distress. Well-developed and well-nourished.  Head: Atraumatic. Normocephalic.  Eyes: PERRL. EOM intact. Conjunctivae are not pale. No scleral icterus.  ENT: Mucous membranes are moist. Oropharynx is clear and symmetric.    Neck: Supple. Full ROM. No lymphadenopathy.  Cardiovascular: Regular rate. Regular rhythm. No murmurs, rubs, or gallops. Distal pulses are 2+ and symmetric.  Pulmonary/Chest: No respiratory distress. Clear to auscultation bilaterally. No wheezing, rales, or rhonchi.  Abdominal: Soft and non-distended.  There is no tenderness.  No rebound, guarding, or rigidity. Good bowel sounds.  Genitourinary: No CVA tenderness  Musculoskeletal: Moves all extremities. No obvious deformities. No edema. No calf tenderness. Bilateral knee FROM. No heat or erythema to bilateral knees. Bilateral knees are non tender.   Skin: Warm and dry.  Neurological:  Alert, awake, and appropriate.  Normal speech.  No acute focal neurological deficits are  "appreciated.  Psychiatric: Normal affect. Good eye contact. Appropriate in content.    ED Course    Procedures  ED Vital Signs:  Vitals:    06/26/17 1820   BP: 131/67   Pulse: 93   Resp: 20   Temp: 98.3 °F (36.8 °C)   TempSrc: Oral   SpO2: 96%   Weight: 88 kg (194 lb)   Height: 5' 11" (1.803 m)                The Emergency Provider reviewed the vital signs and test results, which are outlined above.    ED Discussion     7:20 PM:  Discussed with pt all pertinent ED information and results. Discussed pt dx and plan of tx. Gave pt all f/u and return to the ED instructions. All questions and concerns were addressed at this time. Pt expresses understanding of information and instructions, and is comfortable with plan to discharge. Pt is stable for discharge.    I discussed with patient and/or family/caretaker that evaluation in the ED does not suggest any emergent or life threatening medical conditions requiring immediate intervention beyond what was provided in the ED, and I believe patient is safe for discharge.  Regardless, an unremarkable evaluation in the ED does not preclude the development or presence of a serious of life threatening condition. As such, patient was instructed to return immediately for any worsening or change in current symptoms.      ED Medication(s):  Medications - No data to display    Discharge Medication List as of 6/26/2017  7:16 PM      START taking these medications    Details   meloxicam (MOBIC) 7.5 MG tablet Take 1 tablet (7.5 mg total) by mouth once daily., Starting Mon 6/26/2017, Print      orphenadrine (NORFLEX) 100 mg tablet Take 1 tablet (100 mg total) by mouth 2 (two) times daily., Starting Mon 6/26/2017, Print             Follow-up Information     Ev Rivera MD.    Specialty:  Family Medicine  Contact information:  [address] [place] [address]  [address]  [facility] [id number] [phone]                     Medical Decision Making              Scribe Attestation: "   Scribe #1: I performed the above scribed service and the documentation accurately describes the services I performed. I attest to the accuracy of the note.    Attending:   Physician Attestation Statement for Scribe #1: I, Luisana Almeida PA-C, personally performed the services described in this documentation, as scribed by Driss Dangelo, in my presence, and it is both accurate and complete.          Clinical Impression       ICD-10-CM ICD-9-CM   1. Chronic pain of both knees M25.561 719.46    M25.562 338.29    G89.29        Disposition:   Disposition: Discharged  Condition: Stable         Luisana Almeida PA-C  06/26/17 2156

## 2017-06-27 NOTE — ED NOTES
Patient examined, evaluated, and educated on discharge prescriptions and instructions by [facility]. [facility]

## 2018-04-27 ENCOUNTER — HOSPITAL ENCOUNTER (EMERGENCY)
Facility: HOSPITAL | Age: 48
Discharge: HOME | End: 2018-04-27
Attending: EMERGENCY MEDICINE
Payer: MEDICAID

## 2018-04-27 VITALS
DIASTOLIC BLOOD PRESSURE: 68 MMHG | SYSTOLIC BLOOD PRESSURE: 112 MMHG | RESPIRATION RATE: 16 BRPM | OXYGEN SATURATION: 95 % | TEMPERATURE: 99 F | HEART RATE: 98 BPM

## 2018-04-27 DIAGNOSIS — M17.11 PRIMARY OSTEOARTHRITIS OF RIGHT KNEE: Primary | ICD-10-CM

## 2018-04-27 PROCEDURE — 99283 EMERGENCY DEPT VISIT LOW MDM: CPT

## 2018-04-27 RX ORDER — NAPROXEN 375 MG/1
375 TABLET ORAL 2 TIMES DAILY WITH MEALS
Qty: 30 TABLET | Refills: 0 | Status: SHIPPED | OUTPATIENT
Start: 2018-04-27 | End: 2018-08-03

## 2018-04-27 RX ORDER — HYDROCODONE BITARTRATE AND ACETAMINOPHEN 7.5; 325 MG/1; MG/1
1 TABLET ORAL EVERY 6 HOURS PRN
Qty: 11 TABLET | Refills: 0 | Status: SHIPPED | OUTPATIENT
Start: 2018-04-27 | End: 2018-05-07

## 2018-04-27 NOTE — ED PROVIDER NOTES
"SCRIBE #1 NOTE: I, Driss Dangelo, am scribing for, and in the presence of, Frank Contreras MD. I have scribed the entire note.      History      Chief Complaint   Patient presents with    Knee Pain     right knee pain and swelling; missed appt yesterday for med refills       Review of patient's allergies indicates:   Allergen Reactions    Macrobid [nitrofurantoin monohyd/m-cryst] Hives        HPI   HPI    2018, 12:36 PM   History obtained from the patient      History of Present Illness: Sonali Harrison is a 47 y.o. female patient w/ PMhx of Arthritis presents to the Emergency Department for chronic right knee pain and swelling which onset gradually "years ago", worsening 1 day ago. Symptoms are constant and moderate in severity.  No mitigating or exacerbating factors reported. No other associated sxs reported. Patient denies any fever, chills, decreased ROM, n/v/d, and all other sxs at this time. No further complaints or concerns at this time.     Arrival mode: Personal vehicle    PCP: Ev Rivera MD       Past Medical History:  Past Medical History:   Diagnosis Date    Arthritis     Depression     Diabetes mellitus     GERD (gastroesophageal reflux disease)     Hypertension     Insomnia     Panic attack        Past Surgical History:  Past Surgical History:   Procedure Laterality Date     SECTION      HYSTERECTOMY           Family History:  Family History   Problem Relation Age of Onset    Birth defects Neg Hx        Social History:  Social History     Social History Main Topics    Smoking status: Current Every Day Smoker     Packs/day: 0.25     Types: Cigarettes    Smokeless tobacco: Never Used    Alcohol use No    Drug use: No    Sexual activity: Not on file       ROS   Review of Systems   Constitutional: Negative for chills and fever.   Respiratory: Negative for shortness of breath.    Cardiovascular: Negative for chest pain.   Gastrointestinal: Negative for " abdominal pain, diarrhea, nausea and vomiting.   Genitourinary: Negative for difficulty urinating and urgency.   Musculoskeletal:        + right knee pain & swelling  - decreased ROM   Neurological: Negative for dizziness, syncope, weakness, light-headedness, numbness and headaches.   All other systems reviewed and are negative.      Physical Exam      Initial Vitals [04/27/18 1222]   BP Pulse Resp Temp SpO2   112/68 98 16 98.5 °F (36.9 °C) 95 %      MAP       82.67          Physical Exam  Nursing Notes and Vital Signs Reviewed.  Constitutional: Patient is in no apparent distress. Well-developed and well-nourished.  Head: Atraumatic. Normocephalic.  Eyes: PERRL. EOM intact. Conjunctivae are not pale. No scleral icterus.  ENT: Mucous membranes are moist. Oropharynx is clear and symmetric.    Neck: Supple. Full ROM. No lymphadenopathy.  Cardiovascular: Regular rate. Regular rhythm. No murmurs, rubs, or gallops. Distal pulses are 2+ and symmetric.  Pulmonary/Chest: No respiratory distress. Clear to auscultation bilaterally. No wheezing or rales.  Abdominal: Soft and non-distended.  There is no tenderness.  No rebound, guarding, or rigidity. Good bowel sounds.  Right Knee:  No obvious deformity. Mild right knee swelling. No increased warmth, erythema, induration or fluctuance.  No ligament laxity. DP and PT pulses are 2+.  Normal capillary refill.  Distal sensation is intact.  Musculoskeletal: Moves all extremities. No obvious deformities. No edema. No calf tenderness.  Skin: Warm and dry.  Neurological:  Alert, awake, and appropriate.  Normal speech.  No acute focal neurological deficits are appreciated.  Psychiatric: Normal affect. Good eye contact. Appropriate in content.    ED Course    Procedures  ED Vital Signs:  Vitals:    04/27/18 1222   BP: 112/68   Pulse: 98   Resp: 16   Temp: 98.5 °F (36.9 °C)   TempSrc: Oral   SpO2: 95%              The Emergency Provider reviewed the vital signs and test results, which are  outlined above.    ED Discussion     12:49 PM: Discussed with pt all pertinent ED information and results. Discussed pt dx and plan of tx. Gave pt all f/u and return to the ED instructions. All questions and concerns were addressed at this time. Pt expresses understanding of information and instructions, and is comfortable with plan to discharge. Pt is stable for discharge.    I discussed with patient and/or family/caretaker that evaluation in the ED does not suggest any emergent or life threatening medical conditions requiring immediate intervention beyond what was provided in the ED, and I believe patient is safe for discharge.  Regardless, an unremarkable evaluation in the ED does not preclude the development or presence of a serious of life threatening condition. As such, patient was instructed to return immediately for any worsening or change in current symptoms.      ED Medication(s):  Medications - No data to display    New Prescriptions    HYDROCODONE-ACETAMINOPHEN 7.5-325MG (NORCO) 7.5-325 MG PER TABLET    Take 1 tablet by mouth every 6 (six) hours as needed for Pain.    NAPROXEN (NAPROSYN) 375 MG TABLET    Take 1 tablet (375 mg total) by mouth 2 (two) times daily with meals.             Medical Decision Making              Scribe Attestation:   Scribe #1: I performed the above scribed service and the documentation accurately describes the services I performed. I attest to the accuracy of the note.    Attending:   Physician Attestation Statement for Scribe #1: I, Frank Contreras MD, personally performed the services described in this documentation, as scribed by Driss Dangelo, in my presence, and it is both accurate and complete.          Clinical Impression       ICD-10-CM ICD-9-CM   1. Primary osteoarthritis of right knee M17.11 715.16       Disposition:   Disposition: Discharged  Condition: Stable         Frank Contreras MD  04/27/18 2918

## 2018-08-03 ENCOUNTER — HOSPITAL ENCOUNTER (EMERGENCY)
Facility: HOSPITAL | Age: 48
Discharge: HOME | End: 2018-08-03
Attending: SPECIALIST
Payer: MEDICAID

## 2018-08-03 VITALS
OXYGEN SATURATION: 100 % | TEMPERATURE: 98 F | WEIGHT: 233.69 LBS | BODY MASS INDEX: 34.61 KG/M2 | HEIGHT: 69 IN | RESPIRATION RATE: 20 BRPM | HEART RATE: 99 BPM | SYSTOLIC BLOOD PRESSURE: 135 MMHG | DIASTOLIC BLOOD PRESSURE: 79 MMHG

## 2018-08-03 DIAGNOSIS — Z76.0 ENCOUNTER FOR MEDICATION REFILL: ICD-10-CM

## 2018-08-03 DIAGNOSIS — M25.562 CHRONIC PAIN OF BOTH KNEES: Primary | ICD-10-CM

## 2018-08-03 DIAGNOSIS — M25.561 CHRONIC PAIN OF BOTH KNEES: Primary | ICD-10-CM

## 2018-08-03 DIAGNOSIS — G89.29 CHRONIC PAIN OF BOTH KNEES: Primary | ICD-10-CM

## 2018-08-03 PROCEDURE — 99282 EMERGENCY DEPT VISIT SF MDM: CPT

## 2018-08-03 RX ORDER — MELOXICAM 15 MG/1
15 TABLET ORAL DAILY
Qty: 15 TABLET | Refills: 0 | OUTPATIENT
Start: 2018-08-03 | End: 2023-04-28

## 2018-08-03 RX ORDER — QUETIAPINE FUMARATE 300 MG/1
600 TABLET, FILM COATED ORAL DAILY
Qty: 60 TABLET | Refills: 0 | Status: SHIPPED | OUTPATIENT
Start: 2018-08-03

## 2018-08-03 RX ORDER — QUETIAPINE FUMARATE 300 MG/1
600 TABLET, FILM COATED ORAL DAILY
COMMUNITY
End: 2018-08-03

## 2018-08-03 NOTE — ED NOTES
Patient examined, evaluated, and educated on discharge instructions and prescriptions by JYOTI Lieberman without nursing assistance. Patient discharged to [facility] by PA.

## 2018-08-03 NOTE — ED PROVIDER NOTES
History      Chief Complaint   Patient presents with    Medication Refill     requesting refill for Seroquel and reports chronic arthritis pain        Review of patient's allergies indicates:   Allergen Reactions    Macrobid [nitrofurantoin monohyd/m-cryst] Hives    Nitrofurantoin macrocrystalline Hives and Itching        HPI   HPI    8/3/2018, 9:21 AM   History obtained from the patient       History of Present Illness: Sonali Harrison is a 48 y.o. female patient who presents to the Emergency Department for refill of seroquel and chronic bilateral knee pain.  Denies injury, fever. Symptoms are moderate in severity.     No further complaints or concerns at this time.           PCP: Ev Rivera MD       Past Medical History:  Past Medical History:   Diagnosis Date    Arthritis     Depression     Diabetes mellitus     GERD (gastroesophageal reflux disease)     Hypertension     Insomnia     Panic attack          Past Surgical History:  Past Surgical History:   Procedure Laterality Date     SECTION      HYSTERECTOMY             Family History:  Family History   Problem Relation Age of Onset    Birth defects Neg Hx            Social History:  Social History     Social History Main Topics    Smoking status: Current Every Day Smoker     Packs/day: 0.25     Types: Cigarettes    Smokeless tobacco: Never Used    Alcohol use No    Drug use: No    Sexual activity: Not on file       ROS     Review of Systems   Constitutional: Negative for chills and fever.   HENT: Negative for sore throat.    Respiratory: Negative for shortness of breath.    Cardiovascular: Negative for chest pain.   Gastrointestinal: Negative for nausea and vomiting.   Genitourinary: Negative for dysuria.   Musculoskeletal: Positive for arthralgias. Negative for back pain.   Skin: Negative for rash and wound.   Neurological: Negative for weakness.   Hematological: Does not bruise/bleed easily.   Psychiatric/Behavioral:  "Negative for hallucinations and suicidal ideas.   All other systems reviewed and are negative.      Physical Exam      Initial Vitals [08/03/18 0916]   BP Pulse Resp Temp SpO2   135/79 99 20 98.2 °F (36.8 °C) 100 %      MAP       --         Physical Exam  Vital signs and nursing notes reviewed.  Constitutional: Patient is in NAD. Awake and alert. Well-developed and well-nourished.  Head: Atraumatic. Normocephalic.  Eyes: PERRL. EOM intact. Conjunctivae nl. No scleral icterus.  ENT: Mucous membranes are moist. Oropharynx is clear.  Neck: Supple. No JVD. No lymphadenopathy.  No meningismus  Cardiovascular: Regular rate and rhythm. No murmurs, rubs, or gallops. Distal pulses are 2+ and symmetric.  Pulmonary/Chest: No respiratory distress. Clear to auscultation bilaterally. No wheezing, rales, or rhonchi.  Abdominal: Soft. Non-distended. No TTP. No rebound, guarding, or rigidity. Good bowel sounds.  Genitourinary: No CVA tenderness  Musculoskeletal: Moves all extremities. No edema. Bilateral knees with from, no heat, or erythema.  Skin: Warm and dry.  Neurological: Awake and alert. No acute focal neurological deficits are appreciated.  Psychiatric: Normal affect. Good eye contact. Appropriate in content.      ED Course          Procedures  ED Vital Signs:  Vitals:    08/03/18 0916   BP: 135/79   Pulse: 99   Resp: 20   Temp: 98.2 °F (36.8 °C)   TempSrc: Oral   SpO2: 100%   Weight: 106 kg (233 lb 11.2 oz)   Height: 5' 9" (1.753 m)                 Imaging Results:  Imaging Results    None            The Emergency Provider reviewed the vital signs and test results, which are outlined above.    ED Discussion             Medication(s) given in the ER:  Medications - No data to display        Follow-up Information     Ev Rivera MD In 2 days.    Specialty:  Family Medicine  Contact information:  [address] [place] [address]  [address]  [facility] [address] [phone]                       New Prescriptions    " MELOXICAM (MOBIC) 15 MG TABLET    Take 1 tablet (15 mg total) by mouth once daily.          Medical Decision Making        All findings were reviewed with the patient/family in detail.   All remaining questions and concerns were addressed at that time.  Patient/family has been counseled regarding the need for follow-up as well as the indication to return to the emergency room should new or worrisome developments occur.        MDM               Clinical Impression:        ICD-10-CM ICD-9-CM   1. Chronic pain of both knees M25.561 719.46    M25.562 338.29    G89.29    2. Encounter for medication refill Z76.0 V68.1             Luisana Almeida PA-C  08/03/18 0923

## 2018-08-15 ENCOUNTER — HOSPITAL ENCOUNTER (EMERGENCY)
Facility: HOSPITAL | Age: 48
Discharge: HOME | End: 2018-08-15
Attending: EMERGENCY MEDICINE
Payer: MEDICAID

## 2018-08-15 VITALS
OXYGEN SATURATION: 98 % | BODY MASS INDEX: 35.84 KG/M2 | DIASTOLIC BLOOD PRESSURE: 69 MMHG | SYSTOLIC BLOOD PRESSURE: 122 MMHG | HEART RATE: 91 BPM | HEIGHT: 69 IN | WEIGHT: 242 LBS | TEMPERATURE: 99 F | RESPIRATION RATE: 18 BRPM

## 2018-08-15 DIAGNOSIS — M25.561 CHRONIC PAIN OF RIGHT KNEE: Primary | ICD-10-CM

## 2018-08-15 DIAGNOSIS — G89.29 CHRONIC PAIN OF RIGHT KNEE: Primary | ICD-10-CM

## 2018-08-15 PROCEDURE — 99283 EMERGENCY DEPT VISIT LOW MDM: CPT

## 2018-08-15 RX ORDER — TRAMADOL HYDROCHLORIDE 50 MG/1
50 TABLET ORAL EVERY 6 HOURS PRN
Qty: 10 TABLET | Refills: 0 | Status: SHIPPED | OUTPATIENT
Start: 2018-08-15 | End: 2018-08-25

## 2018-08-15 NOTE — ED PROVIDER NOTES
SCRIBE #1 NOTE: I, Corinne Mack, am scribing for, and in the presence of, Frank Contreras MD. I have scribed the entire note.      History      Chief Complaint   Patient presents with    Knee Pain     c/o right knee pain, denies injury stating chronic issue for the last 4 years        Review of patient's allergies indicates:   Allergen Reactions    Macrobid [nitrofurantoin monohyd/m-cryst] Hives    Nitrofurantoin macrocrystalline Hives and Itching        HPI   HPI    8/15/2018, 10:18 AM   History obtained from the patient      History of Present Illness: Sonali Harrison is a 48 y.o. female patient with PMHx of arthritis, DM, and HTN who presents to the Emergency Department for chronic R knee pain which worsened gradually a few days ago. Symptoms are constant and mild in severity. Weight-bearing and movement worsens the pt's pain. No mitigating factors reported. No associated sxs reported. Patient denies any fever, chills, CP, SOB, N/V/D, back pain, neck pain, injury, HA, and all other sxs at this time. No prior Tx reported. No further complaints or concerns at this time.         Arrival mode: Personal vehicle    PCP: Ev Rivera MD       Past Medical History:  Past Medical History:   Diagnosis Date    Arthritis     Depression     Diabetes mellitus     GERD (gastroesophageal reflux disease)     Hypertension     Insomnia     Panic attack        Past Surgical History:  Past Surgical History:   Procedure Laterality Date     SECTION      HYSTERECTOMY           Family History:  Family History   Problem Relation Age of Onset    Birth defects Neg Hx        Social History:  Social History     Tobacco Use    Smoking status: Current Every Day Smoker     Packs/day: 0.25     Types: Cigarettes    Smokeless tobacco: Never Used   Substance and Sexual Activity    Alcohol use: No    Drug use: No    Sexual activity: Not on file       ROS   Review of Systems   Constitutional: Negative for  chills and fever.   Respiratory: Negative for cough and shortness of breath.    Cardiovascular: Negative for chest pain and leg swelling.   Gastrointestinal: Negative for abdominal pain, diarrhea, nausea and vomiting.   Musculoskeletal: Positive for arthralgias (R knee; chronic). Negative for back pain, neck pain and neck stiffness.        (-) injury   Skin: Negative for rash and wound.   Neurological: Negative for dizziness, light-headedness, numbness and headaches.   All other systems reviewed and are negative.    Physical Exam      Initial Vitals [08/15/18 1015]   BP Pulse Resp Temp SpO2   122/69 91 18 98.6 °F (37 °C) 98 %      MAP       --          Physical Exam  Nursing Notes and Vital Signs Reviewed.  Constitutional: Patient is in no apparent distress. Well-developed and well-nourished.  Head: Atraumatic. Normocephalic.  Eyes: PERRL. EOM intact. Conjunctivae are not pale. No scleral icterus.  ENT: Mucous membranes are moist. Oropharynx is clear and symmetric.    Neck: Supple. Full ROM. No lymphadenopathy.  Cardiovascular: Regular rate. Regular rhythm. No murmurs, rubs, or gallops. Distal pulses are 2+ and symmetric.  Pulmonary/Chest: No respiratory distress. Clear to auscultation bilaterally. No wheezing or rales.  Abdominal: Soft and non-distended.  There is no tenderness.  No rebound, guarding, or rigidity. Obese.  Musculoskeletal: Moves all extremities. No obvious deformities.   Right Knee:  No obvious deformity. There is no swelling.  There is no tenderness.  No increased warmth, erythema, induration or fluctuance.  No ligament laxity.   Normal capillary refill.  Distal sensation is intact.  Skin: Warm and dry.  Neurological:  Alert, awake, and appropriate.  Normal speech.  No acute focal neurological deficits are appreciated.  Psychiatric: Normal affect. Good eye contact. Appropriate in content.    ED Course    Procedures  ED Vital Signs:  Vitals:    08/15/18 1015   BP: 122/69   Pulse: 91   Resp: 18  "  Temp: 98.6 °F (37 °C)   SpO2: 98%   Weight: 109.8 kg (242 lb)   Height: 5' 9" (1.753 m)       Abnormal Lab Results:  Labs Reviewed - No data to display     All Lab Results:  None    Imaging Results:  Imaging Results    None                 The Emergency Provider reviewed the vital signs and test results, which are outlined above.    ED Discussion     10:26 AM: Pt is awake, alert, and in no distress. Discussed pt dx and plan of tx. Gave pt all f/u and return to the ED instructions. All questions and concerns were addressed at this time. Pt expresses understanding of information and instructions, and is comfortable with plan to discharge. Pt is stable for discharge.    I discussed with patient and/or family/caretaker that evaluation in the ED does not suggest any emergent or life threatening medical conditions requiring immediate intervention beyond what was provided in the ED, and I believe patient is safe for discharge.  Regardless, an unremarkable evaluation in the ED does not preclude the development or presence of a serious of life threatening condition. As such, patient was instructed to return immediately for any worsening or change in current symptoms.      ED Medication(s):  Medications - No data to display    New Prescriptions    TRAMADOL (ULTRAM) 50 MG TABLET    Take 1 tablet (50 mg total) by mouth every 6 (six) hours as needed for Pain.       Follow-up Information     [facility] In 2 days.    Contact information:  [address] [facility] [address] [phone]                     Medical Decision Making              Scribe Attestation:   Scribe #1: I performed the above scribed service and the documentation accurately describes the services I performed. I attest to the accuracy of the note.    Attending:   Physician Attestation Statement for Scribe #1: I, Frank Contreras MD, personally performed the services described in this documentation, as scribed by Corinne Mack, in my " presence, and it is both accurate and complete.          Clinical Impression       ICD-10-CM ICD-9-CM   1. Chronic pain of right knee M25.561 719.46    G89.29 338.29       Disposition:   Disposition: Discharged  Condition: Stable           Frank Contreras MD  08/15/18 1158

## 2019-01-24 ENCOUNTER — HOSPITAL ENCOUNTER (EMERGENCY)
Facility: HOSPITAL | Age: 49
Discharge: HOME | End: 2019-01-24
Attending: EMERGENCY MEDICINE
Payer: MEDICAID

## 2019-01-24 VITALS
HEIGHT: 69 IN | RESPIRATION RATE: 20 BRPM | TEMPERATURE: 98 F | OXYGEN SATURATION: 96 % | SYSTOLIC BLOOD PRESSURE: 125 MMHG | DIASTOLIC BLOOD PRESSURE: 69 MMHG | BODY MASS INDEX: 34.23 KG/M2 | HEART RATE: 93 BPM | WEIGHT: 231.13 LBS

## 2019-01-24 DIAGNOSIS — M25.561 ACUTE PAIN OF BOTH KNEES: Primary | ICD-10-CM

## 2019-01-24 DIAGNOSIS — M25.562 ACUTE PAIN OF BOTH KNEES: Primary | ICD-10-CM

## 2019-01-24 PROCEDURE — 99283 EMERGENCY DEPT VISIT LOW MDM: CPT

## 2019-01-24 RX ORDER — CYCLOBENZAPRINE HCL 10 MG
10 TABLET ORAL NIGHTLY PRN
Qty: 15 TABLET | Refills: 0 | Status: SHIPPED | OUTPATIENT
Start: 2019-01-24

## 2019-01-24 NOTE — ED PROVIDER NOTES
"Encounter Date: 2019       History     Chief Complaint   Patient presents with    Knee Pain     Pt states, "I have pain in both my knees, it's arthritis."     48 year old female with complaint of bilateral knee pain X several months.  Report history of arthritis.  No fall. No trauma.  Constant aching pain worse with walking.  No fever or chills. No other complaints.           Review of patient's allergies indicates:   Allergen Reactions    Macrobid [nitrofurantoin monohyd/m-cryst] Hives    Nitrofurantoin macrocrystalline Hives and Itching     Past Medical History:   Diagnosis Date    Arthritis     Depression     Diabetes mellitus     GERD (gastroesophageal reflux disease)     Hypertension     Insomnia     Panic attack      Past Surgical History:   Procedure Laterality Date     SECTION      HYSTERECTOMY       Family History   Problem Relation Age of Onset    Birth defects Neg Hx      Social History     Tobacco Use    Smoking status: Current Every Day Smoker     Packs/day: 0.25     Types: Cigarettes    Smokeless tobacco: Never Used   Substance Use Topics    Alcohol use: No    Drug use: No     Review of Systems   Constitutional: Negative for fever.   HENT: Negative for sore throat.    Respiratory: Negative for shortness of breath.    Cardiovascular: Negative for chest pain.   Gastrointestinal: Negative for nausea.   Genitourinary: Negative for dysuria.   Musculoskeletal: Negative for back pain.        Knee pain    Skin: Negative for rash.   Neurological: Negative for weakness.   Hematological: Does not bruise/bleed easily.       Physical Exam     Initial Vitals [19 1008]   BP Pulse Resp Temp SpO2   125/69 93 20 98 °F (36.7 °C) 96 %      MAP       --         Physical Exam    Nursing note and vitals reviewed.  Constitutional: She appears well-developed and well-nourished.   HENT:   Head: Normocephalic and atraumatic.   Eyes: Conjunctivae and EOM are normal. Pupils are equal, round, and " reactive to light.   Neck: Normal range of motion. Neck supple.   Cardiovascular: Normal rate, regular rhythm, normal heart sounds and intact distal pulses.   Pulmonary/Chest: Breath sounds normal.   Abdominal: Soft. There is no tenderness. There is no rebound and no guarding.   Musculoskeletal: Normal range of motion.   Pain with ROM of both knees, no swelling, no erythema, ambulates without limp   Neurological: She is alert and oriented to person, place, and time. She has normal strength and normal reflexes.   Skin: Skin is warm and dry.   Psychiatric: She has a normal mood and affect. Her behavior is normal. Thought content normal.         ED Course   Procedures  Labs Reviewed - No data to display       Imaging Results    None                               Clinical Impression:   The encounter diagnosis was Acute pain of both knees.                             Jayme Lugo NP  01/24/19 1016

## 2019-01-27 ENCOUNTER — HOSPITAL ENCOUNTER (EMERGENCY)
Facility: HOSPITAL | Age: 49
Discharge: HOME | End: 2019-01-27
Attending: EMERGENCY MEDICINE
Payer: MEDICAID

## 2019-01-27 VITALS
DIASTOLIC BLOOD PRESSURE: 68 MMHG | TEMPERATURE: 98 F | WEIGHT: 241.13 LBS | HEART RATE: 84 BPM | SYSTOLIC BLOOD PRESSURE: 140 MMHG | OXYGEN SATURATION: 99 % | RESPIRATION RATE: 18 BRPM | BODY MASS INDEX: 35.71 KG/M2 | HEIGHT: 69 IN

## 2019-01-27 DIAGNOSIS — R42 DIZZINESS: ICD-10-CM

## 2019-01-27 DIAGNOSIS — R42 VERTIGO: ICD-10-CM

## 2019-01-27 DIAGNOSIS — E11.65 TYPE 2 DIABETES MELLITUS WITH HYPERGLYCEMIA, WITHOUT LONG-TERM CURRENT USE OF INSULIN: Primary | ICD-10-CM

## 2019-01-27 LAB
ALBUMIN SERPL BCP-MCNC: 3.6 G/DL
ALP SERPL-CCNC: 95 U/L
ALT SERPL W/O P-5'-P-CCNC: 38 U/L
ANION GAP SERPL CALC-SCNC: 7 MMOL/L
ANISOCYTOSIS BLD QL SMEAR: SLIGHT
AST SERPL-CCNC: 27 U/L
BACTERIA #/AREA URNS HPF: NORMAL /HPF
BASOPHILS # BLD AUTO: 0.01 K/UL
BASOPHILS NFR BLD: 0.2 %
BILIRUB SERPL-MCNC: 0.2 MG/DL
BILIRUB UR QL STRIP: NEGATIVE
BUN SERPL-MCNC: 14 MG/DL
CALCIUM SERPL-MCNC: 9.2 MG/DL
CHLORIDE SERPL-SCNC: 101 MMOL/L
CLARITY UR: CLEAR
CO2 SERPL-SCNC: 27 MMOL/L
COLOR UR: YELLOW
CREAT SERPL-MCNC: 0.8 MG/DL
DACRYOCYTES BLD QL SMEAR: ABNORMAL
DIFFERENTIAL METHOD: ABNORMAL
EOSINOPHIL # BLD AUTO: 0.1 K/UL
EOSINOPHIL NFR BLD: 1.2 %
ERYTHROCYTE [DISTWIDTH] IN BLOOD BY AUTOMATED COUNT: 13.5 %
EST. GFR  (AFRICAN AMERICAN): >60 ML/MIN/1.73 M^2
EST. GFR  (NON AFRICAN AMERICAN): >60 ML/MIN/1.73 M^2
GLUCOSE SERPL-MCNC: 209 MG/DL
GLUCOSE UR QL STRIP: ABNORMAL
HCT VFR BLD AUTO: 39.5 %
HGB BLD-MCNC: 12.5 G/DL
HGB UR QL STRIP: NEGATIVE
HYPOCHROMIA BLD QL SMEAR: ABNORMAL
KETONES UR QL STRIP: NEGATIVE
LEUKOCYTE ESTERASE UR QL STRIP: NEGATIVE
LYMPHOCYTES # BLD AUTO: 2.8 K/UL
LYMPHOCYTES NFR BLD: 46.5 %
MCH RBC QN AUTO: 22.4 PG
MCHC RBC AUTO-ENTMCNC: 31.6 G/DL
MCV RBC AUTO: 71 FL
MICROSCOPIC COMMENT: NORMAL
MONOCYTES # BLD AUTO: 0.3 K/UL
MONOCYTES NFR BLD: 5.4 %
NEUTROPHILS # BLD AUTO: 2.8 K/UL
NEUTROPHILS NFR BLD: 46.9 %
NITRITE UR QL STRIP: NEGATIVE
OVALOCYTES BLD QL SMEAR: ABNORMAL
PH UR STRIP: 6 [PH] (ref 5–8)
PLATELET # BLD AUTO: 319 K/UL
PLATELET BLD QL SMEAR: ABNORMAL
PMV BLD AUTO: 9.5 FL
POIKILOCYTOSIS BLD QL SMEAR: SLIGHT
POTASSIUM SERPL-SCNC: 4.1 MMOL/L
PROT SERPL-MCNC: 7.3 G/DL
PROT UR QL STRIP: NEGATIVE
RBC # BLD AUTO: 5.57 M/UL
RBC #/AREA URNS HPF: 1 /HPF (ref 0–4)
SODIUM SERPL-SCNC: 135 MMOL/L
SP GR UR STRIP: 1.02 (ref 1–1.03)
SQUAMOUS #/AREA URNS HPF: 5 /HPF
TARGETS BLD QL SMEAR: ABNORMAL
TROPONIN I SERPL DL<=0.01 NG/ML-MCNC: <0.006 NG/ML
URN SPEC COLLECT METH UR: ABNORMAL
UROBILINOGEN UR STRIP-ACNC: NEGATIVE EU/DL
WBC # BLD AUTO: 6.08 K/UL
WBC #/AREA URNS HPF: 1 /HPF (ref 0–5)
YEAST URNS QL MICRO: NORMAL

## 2019-01-27 PROCEDURE — 93005 ELECTROCARDIOGRAM TRACING: CPT

## 2019-01-27 PROCEDURE — 81000 URINALYSIS NONAUTO W/SCOPE: CPT

## 2019-01-27 PROCEDURE — 85025 COMPLETE CBC W/AUTO DIFF WBC: CPT

## 2019-01-27 PROCEDURE — 96365 THER/PROPH/DIAG IV INF INIT: CPT

## 2019-01-27 PROCEDURE — 93010 EKG 12-LEAD: ICD-10-PCS | Mod: ,,, | Performed by: INTERNAL MEDICINE

## 2019-01-27 PROCEDURE — 84484 ASSAY OF TROPONIN QUANT: CPT

## 2019-01-27 PROCEDURE — 99285 EMERGENCY DEPT VISIT HI MDM: CPT | Mod: 25

## 2019-01-27 PROCEDURE — 80053 COMPREHEN METABOLIC PANEL: CPT

## 2019-01-27 PROCEDURE — 96361 HYDRATE IV INFUSION ADD-ON: CPT

## 2019-01-27 PROCEDURE — 63600175 PHARM REV CODE 636 W HCPCS: Performed by: EMERGENCY MEDICINE

## 2019-01-27 PROCEDURE — 25000003 PHARM REV CODE 250: Performed by: EMERGENCY MEDICINE

## 2019-01-27 PROCEDURE — 93010 ELECTROCARDIOGRAM REPORT: CPT | Mod: ,,, | Performed by: INTERNAL MEDICINE

## 2019-01-27 RX ORDER — LOSARTAN POTASSIUM 50 MG/1
50 TABLET ORAL DAILY
COMMUNITY

## 2019-01-27 RX ORDER — FLUOXETINE 10 MG/1
10 TABLET ORAL DAILY
COMMUNITY

## 2019-01-27 RX ORDER — CLINDAMYCIN HYDROCHLORIDE 300 MG/1
300 CAPSULE ORAL EVERY 6 HOURS
COMMUNITY

## 2019-01-27 RX ORDER — ACETAMINOPHEN AND CODEINE PHOSPHATE 300; 60 MG/1; MG/1
TABLET ORAL
COMMUNITY
End: 2022-09-10

## 2019-01-27 RX ORDER — IBUPROFEN 800 MG/1
800 TABLET ORAL 3 TIMES DAILY
COMMUNITY
End: 2023-04-28

## 2019-01-27 RX ORDER — MECLIZINE HYDROCHLORIDE 25 MG/1
25 TABLET ORAL EVERY 6 HOURS PRN
Qty: 15 TABLET | Refills: 0 | Status: SHIPPED | OUTPATIENT
Start: 2019-01-27

## 2019-01-27 RX ORDER — GLIMEPIRIDE 2 MG/1
2 TABLET ORAL
COMMUNITY

## 2019-01-27 RX ADMIN — PROMETHAZINE HYDROCHLORIDE 12.5 MG: 25 INJECTION INTRAMUSCULAR; INTRAVENOUS at 07:01

## 2019-01-27 RX ADMIN — SODIUM CHLORIDE 1000 ML: 0.9 INJECTION, SOLUTION INTRAVENOUS at 07:01

## 2019-01-28 NOTE — ED PROVIDER NOTES
SCRIBE #1 NOTE: I, Donovan Lopez, am scribing for, and in the presence of, Ahmet Naqvi Jr., MD. I have scribed the entire note.      History      Chief Complaint   Patient presents with    Dizziness     with n/v       Review of patient's allergies indicates:   Allergen Reactions    Macrobid [nitrofurantoin monohyd/m-cryst] Hives    Nitrofurantoin macrocrystalline Hives and Itching        HPI   HPI    2019, 7:01 PM   History obtained from the patient      History of Present Illness: Sonali Harrison is a 48 y.o. female patient with a hx of DM, HTN who presents to the Emergency Department for dizziness which onset suddenly an hour pta. She characterizes it as the room spinning. Symptoms are constant and moderate in severity. Sudden head movements exacerbate. Nothing mitigates. Associated sxs include n/v. Patient denies any congestion, ear pain/discharge, sinus pressure, abd pain, CP, SOB, focal weakness/numbness, HA, visual disturbance, and all other sxs at this time. No further complaints or concerns at this time.         Arrival mode: Personal vehicle    PCP: Ev Rivera MD       Past Medical History:  Past Medical History:   Diagnosis Date    Arthritis     Depression     Diabetes mellitus     GERD (gastroesophageal reflux disease)     Hypertension     Insomnia     Panic attack        Past Surgical History:  Past Surgical History:   Procedure Laterality Date     SECTION      HYSTERECTOMY           Family History:  Family History   Problem Relation Age of Onset    Birth defects Neg Hx        Social History:  Social History     Tobacco Use    Smoking status: Current Every Day Smoker     Packs/day: 0.25     Types: Cigarettes    Smokeless tobacco: Never Used   Substance and Sexual Activity    Alcohol use: No    Drug use: No    Sexual activity: Unknown       ROS   Review of Systems   Constitutional: Negative for fever.   HENT: Negative for congestion, ear discharge, ear pain,  sinus pressure, sinus pain and sore throat.    Eyes: Negative for visual disturbance.   Respiratory: Negative for shortness of breath.    Cardiovascular: Negative for chest pain.   Gastrointestinal: Positive for nausea and vomiting. Negative for abdominal pain.   Genitourinary: Negative for dysuria.   Musculoskeletal: Negative for back pain.   Skin: Negative for rash.   Neurological: Positive for dizziness. Negative for syncope, facial asymmetry, speech difficulty, weakness, numbness and headaches.   Hematological: Does not bruise/bleed easily.   All other systems reviewed and are negative.    Physical Exam      Initial Vitals [01/27/19 1851]   BP Pulse Resp Temp SpO2   (!) 118/51 86 18 98.4 °F (36.9 °C) 98 %      MAP       --          Physical Exam  Nursing Notes and Vital Signs Reviewed.  Constitutional: Patient is in no acute distress. Well-developed and well-nourished.  Head: Atraumatic. Normocephalic.  Eyes: PERRL. EOM intact. Conjunctivae are not pale. No scleral icterus.  ENT: Mucous membranes are moist. Oropharynx is clear and symmetric.    Neck: Supple. Full ROM. No lymphadenopathy.  Cardiovascular: Regular rate. Regular rhythm. No murmurs, rubs, or gallops. Distal pulses are 2+ and symmetric.  Pulmonary/Chest: No respiratory distress. Clear to auscultation bilaterally. No wheezing or rales.  Abdominal: Soft and non-distended.  There is no tenderness.  No rebound, guarding, or rigidity.   Musculoskeletal: Moves all extremities. No obvious deformities. No edema. No calf tenderness.  Skin: Warm and dry.  Neurological:  Alert, awake, and appropriate.  Normal speech. Patient has horizontal nystagmus to the right. She becomes dizzy upon turning her head to the right.   Psychiatric: Normal affect. Good eye contact. Appropriate in content.    ED Course    Procedures  ED Vital Signs:  Vitals:    01/27/19 1851 01/27/19 1853 01/27/19 1931 01/27/19 1934   BP: (!) 118/51  127/66 129/63   Pulse: 86  75 80   Resp: 18     "  Temp: 98.4 °F (36.9 °C)      TempSrc: Oral      SpO2: 98%  98% 100%   Weight:  109.4 kg (241 lb 1.6 oz)     Height: 5' 9" (1.753 m)       01/27/19 1937 01/27/19 2000   BP: 113/64 130/69   Pulse: 80 80   Resp:  18   Temp:     TempSrc:     SpO2: 100% 100%   Weight:     Height:         Abnormal Lab Results:  Labs Reviewed   CBC W/ AUTO DIFFERENTIAL - Abnormal; Notable for the following components:       Result Value    RBC 5.57 (*)     MCV 71 (*)     MCH 22.4 (*)     MCHC 31.6 (*)     All other components within normal limits   COMPREHENSIVE METABOLIC PANEL - Abnormal; Notable for the following components:    Sodium 135 (*)     Glucose 209 (*)     Anion Gap 7 (*)     All other components within normal limits   URINALYSIS - Abnormal; Notable for the following components:    Glucose, UA 3+ (*)     All other components within normal limits   TROPONIN I   URINALYSIS MICROSCOPIC        All Lab Results:  Results for orders placed or performed during the hospital encounter of 01/27/19   CBC auto differential   Result Value Ref Range    WBC 6.08 3.90 - 12.70 K/uL    RBC 5.57 (H) 4.00 - 5.40 M/uL    Hemoglobin 12.5 12.0 - 16.0 g/dL    Hematocrit 39.5 37.0 - 48.5 %    MCV 71 (L) 82 - 98 fL    MCH 22.4 (L) 27.0 - 31.0 pg    MCHC 31.6 (L) 32.0 - 36.0 g/dL    RDW 13.5 11.5 - 14.5 %    Platelets 319 150 - 350 K/uL    MPV 9.5 9.2 - 12.9 fL   Comprehensive metabolic panel   Result Value Ref Range    Sodium 135 (L) 136 - 145 mmol/L    Potassium 4.1 3.5 - 5.1 mmol/L    Chloride 101 95 - 110 mmol/L    CO2 27 23 - 29 mmol/L    Glucose 209 (H) 70 - 110 mg/dL    BUN, Bld 14 6 - 20 mg/dL    Creatinine 0.8 0.5 - 1.4 mg/dL    Calcium 9.2 8.7 - 10.5 mg/dL    Total Protein 7.3 6.0 - 8.4 g/dL    Albumin 3.6 3.5 - 5.2 g/dL    Total Bilirubin 0.2 0.1 - 1.0 mg/dL    Alkaline Phosphatase 95 55 - 135 U/L    AST 27 10 - 40 U/L    ALT 38 10 - 44 U/L    Anion Gap 7 (L) 8 - 16 mmol/L    eGFR if African American >60 >60 mL/min/1.73 m^2    eGFR if non " African American >60 >60 mL/min/1.73 m^2   Urinalysis   Result Value Ref Range    Specimen UA Urine, Clean Catch     Color, UA Yellow Yellow, Straw, Sophia    Appearance, UA Clear Clear    pH, UA 6.0 5.0 - 8.0    Specific Gravity, UA 1.025 1.005 - 1.030    Protein, UA Negative Negative    Glucose, UA 3+ (A) Negative    Ketones, UA Negative Negative    Bilirubin (UA) Negative Negative    Occult Blood UA Negative Negative    Nitrite, UA Negative Negative    Urobilinogen, UA Negative <2.0 EU/dL    Leukocytes, UA Negative Negative         Imaging Results:  Imaging Results          CT Head Without Contrast (Final result)  Result time 01/27/19 20:03:23    Final result by Erik Vargas MD (01/27/19 20:03:23)                 Impression:      No acute findings.    All CT scans at this facility are performed  using dose modulation techniques as appropriate to performed exam including the following:  automated exposure control; adjustment of mA and/or kV according to the patients size (this includes techniques or standardized protocols for targeted exams where dose is matched to indication/reason for exam: i.e. extremities or head);  iterative reconstruction technique.      Electronically signed by: Erik Vargas MD  Date:    01/27/2019  Time:    20:03             Narrative:    EXAMINATION:  CT HEAD WITHOUT CONTRAST    CLINICAL HISTORY:  Ataxia    FINDINGS:  No intracranial hemorrhage or acute findings are demonstrated. The visualized paranasal sinuses are clear. The calvarium is intact.                               The EKG was ordered, reviewed, and independently interpreted by the ED provider.  Interpretation time: 1923  Rate: 74 BPM  Rhythm: normal sinus rhythm  Interpretation: No acute ST changes. No STEMI.             The Emergency Provider reviewed the vital signs and test results, which are outlined above.    ED Discussion     8:22 PM: Reassessed pt at this time.  Pt states her condition has improved at this time.  Discussed with pt all pertinent ED information and results. Discussed pt dx and plan of tx. Gave pt all f/u and return to the ED instructions. All questions and concerns were addressed at this time. Pt expresses understanding of information and instructions, and is comfortable with plan to discharge. Pt is stable for discharge.    I discussed with patient and/or family/caretaker that evaluation in the ED does not suggest any emergent or life threatening medical conditions requiring immediate intervention beyond what was provided in the ED, and I believe patient is safe for discharge.  Regardless, an unremarkable evaluation in the ED does not preclude the development or presence of a serious of life threatening condition. As such, patient was instructed to return immediately for any worsening or change in current symptoms.      ED Medication(s):  Medications   sodium chloride 0.9% bolus 1,000 mL (1,000 mLs Intravenous New Bag 1/27/19 1936)   promethazine (PHENERGAN) 12.5 mg in dextrose 5 % 50 mL IVPB (0 mg Intravenous Stopped 1/27/19 1955)       Follow-up Information     Ev Rivera MD. Call in 1 day.    Specialty:  Family Medicine  Why:  to isaiah pittman for recheck  Contact information:  [address] [place] [address]  [address]  [facility] [address]  [phone]                   Current Discharge Medication List      START taking these medications    Details   meclizine (ANTIVERT) 25 mg tablet Take 1 tablet (25 mg total) by mouth every 6 (six) hours as needed for Dizziness or Nausea.  Qty: 15 tablet, Refills: 0               Medical Decision Making    Medical Decision Making:   Clinical Tests:   Lab Tests: Ordered and Reviewed  Radiological Study: Ordered and Reviewed  Medical Tests: Ordered and Reviewed           Scribe Attestation:   Scribe #1: I performed the above scribed service and the documentation accurately describes the services I performed. I attest to the accuracy of the note.    Attending:    Physician Attestation Statement for Scribe #1: I, Ahmet Naqvi Jr., MD, personally performed the services described in this documentation, as scribed by Donovan Lopez, in my presence, and it is both accurate and complete.          Clinical Impression       ICD-10-CM ICD-9-CM   1. Type 2 diabetes mellitus with hyperglycemia, without long-term current use of insulin E11.65 250.00     790.29   2. Dizziness R42 780.4   3. Vertigo R42 780.4       Disposition:   Disposition: Discharged  Condition: Stable         Ahmet Naqvi Jr., MD  01/27/19 2029

## 2019-06-19 ENCOUNTER — IMMUNIZATION (OUTPATIENT)
Dept: PHARMACY | Facility: CLINIC | Age: 49
End: 2019-06-19
Payer: MEDICAID

## 2019-11-11 ENCOUNTER — HOSPITAL ENCOUNTER (EMERGENCY)
Facility: HOSPITAL | Age: 49
Discharge: HOME | End: 2019-11-11
Attending: EMERGENCY MEDICINE
Payer: MEDICAID

## 2019-11-11 VITALS
RESPIRATION RATE: 18 BRPM | HEART RATE: 98 BPM | DIASTOLIC BLOOD PRESSURE: 73 MMHG | OXYGEN SATURATION: 98 % | TEMPERATURE: 99 F | BODY MASS INDEX: 32.39 KG/M2 | SYSTOLIC BLOOD PRESSURE: 141 MMHG | WEIGHT: 219.38 LBS

## 2019-11-11 DIAGNOSIS — S16.1XXA CERVICAL STRAIN, ACUTE, INITIAL ENCOUNTER: Primary | ICD-10-CM

## 2019-11-11 DIAGNOSIS — S39.012A STRAIN OF LUMBAR REGION, INITIAL ENCOUNTER: ICD-10-CM

## 2019-11-11 PROCEDURE — 99284 EMERGENCY DEPT VISIT MOD MDM: CPT | Mod: 25

## 2019-11-11 RX ORDER — TRAMADOL HYDROCHLORIDE 50 MG/1
50 TABLET ORAL EVERY 6 HOURS PRN
Qty: 12 TABLET | Refills: 0 | Status: SHIPPED | OUTPATIENT
Start: 2019-11-11 | End: 2022-09-10
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
## 2022-09-10 ENCOUNTER — HOSPITAL ENCOUNTER (EMERGENCY)
Facility: HOSPITAL | Age: 52
Discharge: HOME | End: 2022-09-10
Attending: EMERGENCY MEDICINE
Payer: MEDICAID

## 2022-09-10 VITALS
WEIGHT: 231.06 LBS | BODY MASS INDEX: 33.08 KG/M2 | SYSTOLIC BLOOD PRESSURE: 122 MMHG | RESPIRATION RATE: 20 BRPM | DIASTOLIC BLOOD PRESSURE: 69 MMHG | OXYGEN SATURATION: 100 % | HEART RATE: 98 BPM | TEMPERATURE: 98 F | HEIGHT: 70 IN

## 2022-09-10 DIAGNOSIS — M54.50 CHRONIC BILATERAL LOW BACK PAIN WITHOUT SCIATICA: Primary | ICD-10-CM

## 2022-09-10 DIAGNOSIS — G89.29 CHRONIC BILATERAL LOW BACK PAIN WITHOUT SCIATICA: Primary | ICD-10-CM

## 2022-09-10 LAB
CTP QC/QA: YES
HCV AB SERPL QL IA: NEGATIVE
HEP C VIRUS HOLD SPECIMEN: NORMAL
HIV 1+2 AB+HIV1 P24 AG SERPL QL IA: NEGATIVE
POCT GLUCOSE: 125 MG/DL (ref 70–110)
SARS-COV-2 RDRP RESP QL NAA+PROBE: NEGATIVE

## 2022-09-10 PROCEDURE — U0002 COVID-19 LAB TEST NON-CDC: HCPCS | Performed by: NURSE PRACTITIONER

## 2022-09-10 PROCEDURE — 87389 HIV-1 AG W/HIV-1&-2 AB AG IA: CPT | Performed by: EMERGENCY MEDICINE

## 2022-09-10 PROCEDURE — 82962 GLUCOSE BLOOD TEST: CPT

## 2022-09-10 PROCEDURE — 86803 HEPATITIS C AB TEST: CPT | Performed by: EMERGENCY MEDICINE

## 2022-09-10 PROCEDURE — 99284 EMERGENCY DEPT VISIT MOD MDM: CPT | Mod: 25

## 2022-09-10 RX ORDER — HYDROCODONE BITARTRATE AND ACETAMINOPHEN 7.5; 325 MG/1; MG/1
1 TABLET ORAL EVERY 6 HOURS PRN
Qty: 10 TABLET | Refills: 0 | Status: SHIPPED | OUTPATIENT
Start: 2022-09-10 | End: 2022-09-20

## 2022-09-10 NOTE — Clinical Note
"Sonali Awnacathleen Harrison was seen and treated in our emergency department on 9/10/2022.  She may return to work on 09/12/2022.       If you have any questions or concerns, please don't hesitate to call.      Masoud Michael NP"

## 2022-09-10 NOTE — FIRST PROVIDER EVALUATION
Medical screening examination initiat complaints complains of back pain ed.  I have conducted a focused provider triage encounter, findings are as follows:    Brief history of present illness:  Patient with numerous musculoskeletal     There were no vitals filed for this visit.    Pertinent physical exam:  NAD    Brief workup plan:  COVID swab back x-ray    Preliminary workup initiated; this workup will be continued and followed by the physician or advanced practice provider that is assigned to the patient when roomed.

## 2022-09-12 NOTE — ED PROVIDER NOTES
Encounter Date: 9/10/2022       History     Chief Complaint   Patient presents with    Back Pain     Low back pain x 1 week ago, fell on stairs 1 week ago, taking ICY Hot, but no relief.     Patient complains of generalized body aching and lower back pain for several days.  Denies any injury.  Denies any fever.  Has been taking prescribed over-the-counter medications for this with a getting any relief      Review of patient's allergies indicates:   Allergen Reactions    Macrobid [nitrofurantoin monohyd/m-cryst] Hives    Nitrofurantoin macrocrystalline Hives and Itching     Past Medical History:   Diagnosis Date    Arthritis     Depression     Diabetes mellitus     GERD (gastroesophageal reflux disease)     Hypertension     Insomnia     Panic attack      Past Surgical History:   Procedure Laterality Date     SECTION      HYSTERECTOMY       Family History   Problem Relation Age of Onset    Birth defects Neg Hx      Social History     Tobacco Use    Smoking status: Every Day     Packs/day: 0.25     Types: Cigarettes    Smokeless tobacco: Never   Substance Use Topics    Alcohol use: No    Drug use: No     Review of Systems   Constitutional:  Negative for fever.   HENT:  Negative for sore throat.    Respiratory:  Negative for shortness of breath.    Cardiovascular:  Negative for chest pain.   Gastrointestinal:  Negative for nausea.   Genitourinary:  Negative for dysuria.   Musculoskeletal:  Negative for back pain.   Skin:  Negative for rash.   Neurological:  Negative for weakness.   Hematological:  Does not bruise/bleed easily.     Physical Exam     Initial Vitals [09/10/22 1014]   BP Pulse Resp Temp SpO2   122/69 98 20 98.1 °F (36.7 °C) 100 %      MAP       --         Physical Exam    Nursing note and vitals reviewed.  Constitutional: She appears well-developed and well-nourished. She is not diaphoretic. She is active.  Non-toxic appearance. No distress.   HENT:   Head: Normocephalic and atraumatic.   Eyes:  Conjunctivae are normal. Right eye exhibits no discharge. Left eye exhibits no discharge. No scleral icterus.   Neck:   Normal range of motion.  Cardiovascular:  Normal rate, regular rhythm and intact distal pulses.           No murmur heard.  Pulmonary/Chest: Breath sounds normal. No respiratory distress. She has no wheezes.   Abdominal: She exhibits no distension.   Musculoskeletal:         General: No tenderness. Normal range of motion.      Cervical back: Normal range of motion.     Neurological: She is alert and oriented to person, place, and time. No cranial nerve deficit. GCS score is 15. GCS eye subscore is 4. GCS verbal subscore is 5. GCS motor subscore is 6.   Skin: Skin is warm and dry. Capillary refill takes less than 2 seconds. No rash noted.   Psychiatric: She has a normal mood and affect. Her behavior is normal. Judgment and thought content normal.       ED Course   Procedures  Labs Reviewed   POCT GLUCOSE - Abnormal; Notable for the following components:       Result Value    POCT Glucose 125 (*)     All other components within normal limits   SARS-COV-2 RDRP GENE - Normal    Narrative:     This test utilizes isothermal nucleic acid amplification   technology to detect the SARS-CoV-2 RdRp nucleic acid segment.   The analytical sensitivity (limit of detection) is 125 genome   equivalents/mL.   A POSITIVE result implies infection with the SARS-CoV-2 virus;   the patient is presumed to be contagious.     A NEGATIVE result means that SARS-CoV-2 nucleic acids are not   present above the limit of detection. A NEGATIVE result should be   treated as presumptive. It does not rule out the possibility of   COVID-19 and should not be the sole basis for treatment decisions.   If COVID-19 is strongly suspected based on clinical and exposure   history, re-testing using an alternate molecular assay should be   considered.   This test is only for use under the Food and Drug   Administration s Emergency Use  Authorization (EUA).   Commercial kits are provided by [employer].   Performance characteristics of the EUA have been independently   verified by [facility] Department of   Pathology and Laboratory Medicine.   _________________________________________________________________   The authorized Fact Sheet for Healthcare Providers and the authorized Fact   Sheet for Patients of the ID NOW COVID-19 are available on the FDA   website:     https://www.fda.gov/media/541512/download  https://www.fda.gov/media/863188/download           HIV 1 / 2 ANTIBODY    Narrative:     Release to patient->Immediate   HEPATITIS C ANTIBODY    Narrative:     Release to patient->Immediate   HEP C VIRUS HOLD SPECIMEN    Narrative:     Release to patient->Immediate          Imaging Results              X-Ray Lumbar Spine Ap And Lateral (Final result)  Result time 09/10/22 11:20:06      Final result by Terry Melgar III, MD (09/10/22 11:20:06)                   Impression:      No acute abnormality identified in the lumbar spine.      Electronically signed by: Terry Melgar MD  Date:    09/10/2022  Time:    11:20               Narrative:    EXAMINATION:  XR LUMBAR SPINE AP AND LATERAL    CLINICAL HISTORY:  Back pain;    FINDINGS:  There is no evidence of fracture. Vertebral body alignment is within normal limits.  Stable mild multilevel lumbar endplate spurring without significant disc height loss.  Stable lower lumbar facet arthropathy.  The visualized sacrum and sacroiliac joints appear intact.                                       Medications - No data to display                       Clinical Impression:   Final diagnoses:  [M54.50, G89.29] Chronic bilateral low back pain without sciatica (Primary)        ED Disposition Condition    Discharge Stable          ED Prescriptions       Medication Sig Dispense Start Date End Date Auth. Provider    HYDROcodone-acetaminophen (NORCO) 7.5-325 mg per tablet Take 1 tablet by mouth  every 6 (six) hours as needed for Pain. 10 tablet 9/10/2022 9/20/2022 Masoud Michael NP          Follow-up Information       Follow up With Specialties Details Why Contact Info    Ev Rivera MD Family Medicine Schedule an appointment as soon as possible for a visit  As needed [address] [place] [address]  [address]  [facility] [address]  [phone]               Masoud Michael NP  09/11/22 1939

## 2023-01-23 ENCOUNTER — HOSPITAL ENCOUNTER (EMERGENCY)
Facility: HOSPITAL | Age: 53
Discharge: HOME | End: 2023-01-23
Attending: EMERGENCY MEDICINE
Payer: MEDICAID

## 2023-01-23 VITALS
RESPIRATION RATE: 18 BRPM | WEIGHT: 230.69 LBS | HEART RATE: 100 BPM | HEIGHT: 70 IN | TEMPERATURE: 98 F | SYSTOLIC BLOOD PRESSURE: 141 MMHG | DIASTOLIC BLOOD PRESSURE: 70 MMHG | BODY MASS INDEX: 33.03 KG/M2 | OXYGEN SATURATION: 98 %

## 2023-01-23 DIAGNOSIS — S39.012A STRAIN OF LUMBAR REGION, INITIAL ENCOUNTER: Primary | ICD-10-CM

## 2023-01-23 PROCEDURE — 99283 EMERGENCY DEPT VISIT LOW MDM: CPT

## 2023-01-23 RX ORDER — BUTALBITAL, ACETAMINOPHEN AND CAFFEINE 50; 325; 40 MG/1; MG/1; MG/1
1 TABLET ORAL EVERY 6 HOURS PRN
Qty: 15 TABLET | Refills: 0 | Status: SHIPPED | OUTPATIENT
Start: 2023-01-23

## 2023-01-23 NOTE — ED PROVIDER NOTES
Encounter Date: 2023       History     Chief Complaint   Patient presents with    Back Pain     Lower back pain with bilateral knee pain after a trip and fall. No LOC, no blood thinners.      52-year-old female with complaint lower back pain over the past 2 days.  Patient reports she fell yesterday and landed on her back.  Patient reports constant aching pain as worse with movement.      Review of patient's allergies indicates:   Allergen Reactions    Macrobid [nitrofurantoin monohyd/m-cryst] Hives    Nitrofurantoin macrocrystalline Hives and Itching     Past Medical History:   Diagnosis Date    Arthritis     Depression     Diabetes mellitus     GERD (gastroesophageal reflux disease)     Hypertension     Insomnia     Panic attack      Past Surgical History:   Procedure Laterality Date     SECTION      HYSTERECTOMY       Family History   Problem Relation Age of Onset    Birth defects Neg Hx      Social History     Tobacco Use    Smoking status: Every Day     Packs/day: 0.25     Types: Cigarettes    Smokeless tobacco: Never   Substance Use Topics    Alcohol use: No    Drug use: No     Review of Systems   Constitutional:  Negative for fever.   HENT:  Negative for sore throat.    Respiratory:  Negative for shortness of breath.    Cardiovascular:  Negative for chest pain.   Gastrointestinal:  Negative for nausea.   Genitourinary:  Negative for dysuria.   Musculoskeletal:  Positive for back pain.   Skin:  Negative for rash.   Neurological:  Negative for weakness.   Hematological:  Does not bruise/bleed easily.     Physical Exam     Initial Vitals [23 0931]   BP Pulse Resp Temp SpO2   (!) 141/70 100 18 97.6 °F (36.4 °C) 98 %      MAP       --         Physical Exam    Nursing note and vitals reviewed.  Constitutional: She appears well-developed and well-nourished.   HENT:   Head: Normocephalic and atraumatic.   Eyes: Conjunctivae and EOM are normal. Pupils are equal, round, and reactive to light.   Neck:  Neck supple.   Normal range of motion.  Cardiovascular:  Normal rate, regular rhythm, normal heart sounds and intact distal pulses.           Pulmonary/Chest: Breath sounds normal.   Abdominal: Abdomen is soft. There is no abdominal tenderness. There is no rebound and no guarding.   Musculoskeletal:         General: Normal range of motion.      Cervical back: Normal range of motion and neck supple.      Comments: Lower lumbar spine tenderness, full ROM X 4, 5/5 X 4     Neurological: She is alert and oriented to person, place, and time. She has normal strength and normal reflexes.   Skin: Skin is warm and dry.   Psychiatric: She has a normal mood and affect. Her behavior is normal. Thought content normal.       ED Course   Procedures  Labs Reviewed - No data to display       Imaging Results              X-Ray Lumbar Spine Ap And Lateral (Final result)  Result time 01/23/23 09:59:58      Final result by MARINE Jose Sr., MD (01/23/23 09:59:58)                   Impression:      1. The lumbar spine is normal in appearance.  2. There are mild degenerative changes between T11 and T12.      Electronically signed by: Philip Jose MD  Date:    01/23/2023  Time:    09:59               Narrative:    EXAMINATION:  XR LUMBAR SPINE AP AND LATERAL    CLINICAL HISTORY:  back pain;    COMPARISON:  09/10/2022    FINDINGS:  There are 5 lumbar type vertebral bodies. There is no fracture, spondylolisthesis, or scoliosis. There is normal lumbar lordosis.  There are mild degenerative changes between T11 and T12.                                       Imaging Results              X-Ray Lumbar Spine Ap And Lateral (Final result)  Result time 01/23/23 09:59:58      Final result by MARINE Jose Sr., MD (01/23/23 09:59:58)                   Impression:      1. The lumbar spine is normal in appearance.  2. There are mild degenerative changes between T11 and T12.      Electronically signed by: Philip Jose  MD  Date:    01/23/2023  Time:    09:59               Narrative:    EXAMINATION:  XR LUMBAR SPINE AP AND LATERAL    CLINICAL HISTORY:  back pain;    COMPARISON:  09/10/2022    FINDINGS:  There are 5 lumbar type vertebral bodies. There is no fracture, spondylolisthesis, or scoliosis. There is normal lumbar lordosis.  There are mild degenerative changes between T11 and T12.                                      Medications - No data to display                           Clinical Impression:   Final diagnoses:  [S39.012A] Strain of lumbar region, initial encounter (Primary)        ED Disposition Condition    Discharge Stable          ED Prescriptions       Medication Sig Dispense Start Date End Date Auth. Provider    butalbital-acetaminophen-caffeine -40 mg (FIORICET, ESGIC) -40 mg per tablet Take 1 tablet by mouth every 6 (six) hours as needed for Pain. 15 tablet 1/23/2023 -- Jayme Lugo NP          Follow-up Information       Follow up With Specialties Details Why Contact Info    Ev Rivera MD Family Medicine Schedule an appointment as soon as possible for a visit in 2 days  [address] [place] [address]  [address]  [facility]  [place] [address]  [phone]               Jayme Lugo NP  01/23/23 1016

## 2023-04-28 ENCOUNTER — TELEPHONE (OUTPATIENT)
Dept: SPORTS MEDICINE | Facility: CLINIC | Age: 53
End: 2023-04-28
Payer: MEDICAID

## 2023-04-28 ENCOUNTER — HOSPITAL ENCOUNTER (EMERGENCY)
Facility: HOSPITAL | Age: 53
Discharge: HOME | End: 2023-04-28
Attending: EMERGENCY MEDICINE
Payer: MEDICAID

## 2023-04-28 VITALS
OXYGEN SATURATION: 100 % | TEMPERATURE: 98 F | WEIGHT: 234.88 LBS | BODY MASS INDEX: 33.63 KG/M2 | HEART RATE: 100 BPM | RESPIRATION RATE: 18 BRPM | HEIGHT: 70 IN | SYSTOLIC BLOOD PRESSURE: 145 MMHG | DIASTOLIC BLOOD PRESSURE: 61 MMHG

## 2023-04-28 DIAGNOSIS — M25.512 SHOULDER PAIN, LEFT: Primary | ICD-10-CM

## 2023-04-28 PROCEDURE — 99283 EMERGENCY DEPT VISIT LOW MDM: CPT

## 2023-04-28 RX ORDER — NAPROXEN 500 MG/1
500 TABLET ORAL 2 TIMES DAILY WITH MEALS
Qty: 10 TABLET | Refills: 0 | Status: SHIPPED | OUTPATIENT
Start: 2023-04-28 | End: 2023-05-03

## 2023-04-28 NOTE — TELEPHONE ENCOUNTER
----- Message from Jonathan Erazo sent at 4/28/2023  1:37 PM CDT -----  Who Called: pt       What is the reqeust in detail: pt is a appt for left shoulder pain. Please advise       Can the clinic reply by [facility]? No       Best Call Back Number: [phone] (home) or [phone]        Additional Information:

## 2023-04-28 NOTE — ED PROVIDER NOTES
Encounter Date: 2023       History     Chief Complaint   Patient presents with    Shoulder Pain     Left shoulder and neck pain, chronic, flare up started last night     The history is provided by the patient.   Shoulder Pain  This is a new problem. The current episode started yesterday. The problem occurs constantly. The problem has not changed since onset.Pertinent negatives include no chest pain, no abdominal pain, no headaches and no shortness of breath. Exacerbated by: Movement. Nothing relieves the symptoms.   Review of patient's allergies indicates:   Allergen Reactions    Macrobid [nitrofurantoin monohyd/m-cryst] Hives    Nitrofurantoin macrocrystalline Hives and Itching     Past Medical History:   Diagnosis Date    Arthritis     Depression     Diabetes mellitus     GERD (gastroesophageal reflux disease)     Hypertension     Insomnia     Panic attack      Past Surgical History:   Procedure Laterality Date     SECTION      HYSTERECTOMY       Family History   Problem Relation Age of Onset    Birth defects Neg Hx      Social History     Tobacco Use    Smoking status: Every Day     Packs/day: 0.25     Types: Cigarettes    Smokeless tobacco: Never   Substance Use Topics    Alcohol use: No    Drug use: No     Review of Systems   Constitutional:  Negative for chills, fatigue and fever.   HENT:  Negative for ear pain.    Eyes:  Negative for pain.   Respiratory:  Negative for cough and shortness of breath.    Cardiovascular:  Negative for chest pain and palpitations.   Gastrointestinal:  Negative for abdominal pain, nausea and vomiting.   Genitourinary:  Negative for dysuria.   Musculoskeletal:  Negative for back pain, myalgias and neck pain.        +left shoulder pain   Skin:  Negative for rash.   Neurological:  Negative for weakness, numbness and headaches.   All other systems reviewed and are negative.    Physical Exam     Initial Vitals [23 1200]   BP Pulse Resp Temp SpO2   (!) 145/61 100 18  98.4 °F (36.9 °C) 100 %      MAP       --         Physical Exam    Nursing note and vitals reviewed.  Constitutional: She appears well-developed and well-nourished. She is not diaphoretic. No distress.   HENT:   Head: Normocephalic and atraumatic.   Eyes: EOM are normal.   Neck: Neck supple.   Normal range of motion.  Cardiovascular:  Normal rate, regular rhythm and intact distal pulses.           Pulmonary/Chest: Breath sounds normal. No respiratory distress.   Musculoskeletal:         General: Normal range of motion.      Left shoulder: Tenderness present. No swelling or deformity.      Cervical back: Normal range of motion and neck supple.     Neurological: She is alert and oriented to person, place, and time. She has normal strength. No sensory deficit. GCS score is 15. GCS eye subscore is 4. GCS verbal subscore is 5. GCS motor subscore is 6.   Skin: Skin is warm and dry. Capillary refill takes less than 2 seconds.       ED Course   Procedures  Labs Reviewed - No data to display       Imaging Results              X-Ray Shoulder Trauma Left (Final result)  Result time 04/28/23 12:46:27      Final result by MARINE Jose Sr., MD (04/28/23 12:46:27)                   Impression:      1. There is no acute fracture visualized.  2. There are sclerotic changes in the lateral aspect of the left humeral head.  This is characteristic of impingement.  3. If additional imaging evaluation is clinically indicated, I recommend consideration of a nonemergent MRI examination of the left shoulder without IV contrast.      Electronically signed by: Philip Jose MD  Date:    04/28/2023  Time:    12:46               Narrative:    EXAMINATION:  XR SHOULDER TRAUMA 3 VIEW LEFT    CLINICAL HISTORY:  Pain in left shoulder    COMPARISON:  None    FINDINGS:  There is no acute fracture visualized.  There are sclerotic changes in the lateral aspect of the left humeral head.  There is no dislocation.                                        Medications - No data to display                  Discussed imaging results with patient and she verbalized understanding with no further questions or concerns.  Orthopedic referral sent at the time of discharge.  Naproxen Rx provided.    Patient agrees with plan and voices no further concerns.  I discussed with patient that evaluation in the ED does not suggest any emergent or life threatening medical conditions requiring immediate intervention beyond what was provided in the ED, and I believe patient is safe for discharge. Regardless, an unremarkable evaluation in the ED does not preclude the development or presence of a serious of life threatening condition. As such, patient was instructed to return immediately for any worsening or change in current symptoms.           Clinical Impression:   Final diagnoses:  [M25.512] Shoulder pain, left (Primary)        ED Disposition Condition    Discharge Stable          ED Prescriptions       Medication Sig Dispense Start Date End Date Auth. Provider    naproxen (NAPROSYN) 500 MG tablet Take 1 tablet (500 mg total) by mouth 2 (two) times daily with meals. for 5 days 10 tablet 4/28/2023 5/3/2023 Oc Diaz NP          Follow-up Information       Follow up With Specialties Details Why Contact Info Additional Information    Ev Rivera MD Family Medicine In 2 days  [address] [facility]  [address]  [facility] [address]  [phone]       [facility] - Emergency Dept. Emergency Medicine  As needed, If symptoms worsen [address]  [facility] [address] [phone]     [facility] In 2 days  [address] [facility] [address] [phone] Please park on the [address] side and use the Clinic entrance. Check in on the 1st floor, to the right across from the café.             Oc Diaz NP  04/28/23 7904

## 2023-05-12 ENCOUNTER — HOSPITAL ENCOUNTER (EMERGENCY)
Facility: HOSPITAL | Age: 53
Discharge: HOME | End: 2023-05-12
Attending: EMERGENCY MEDICINE
Payer: MEDICAID

## 2023-05-12 VITALS
RESPIRATION RATE: 18 BRPM | BODY MASS INDEX: 35.79 KG/M2 | WEIGHT: 250 LBS | OXYGEN SATURATION: 100 % | HEART RATE: 77 BPM | SYSTOLIC BLOOD PRESSURE: 147 MMHG | HEIGHT: 70 IN | TEMPERATURE: 98 F | DIASTOLIC BLOOD PRESSURE: 83 MMHG

## 2023-05-12 DIAGNOSIS — R73.9 HYPERGLYCEMIA: ICD-10-CM

## 2023-05-12 DIAGNOSIS — Z76.0 ENCOUNTER FOR MEDICATION REFILL: Primary | ICD-10-CM

## 2023-05-12 DIAGNOSIS — G89.29 CHRONIC LOW BACK PAIN WITHOUT SCIATICA, UNSPECIFIED BACK PAIN LATERALITY: ICD-10-CM

## 2023-05-12 DIAGNOSIS — M54.50 CHRONIC LOW BACK PAIN WITHOUT SCIATICA, UNSPECIFIED BACK PAIN LATERALITY: ICD-10-CM

## 2023-05-12 DIAGNOSIS — I10 ESSENTIAL HYPERTENSION: ICD-10-CM

## 2023-05-12 DIAGNOSIS — R60.0 PEDAL EDEMA: ICD-10-CM

## 2023-05-12 LAB
ALBUMIN SERPL BCP-MCNC: 3 G/DL (ref 3.5–5.2)
ALP SERPL-CCNC: 88 U/L (ref 55–135)
ALT SERPL W/O P-5'-P-CCNC: 33 U/L (ref 10–44)
ANION GAP SERPL CALC-SCNC: 8 MMOL/L (ref 8–16)
AST SERPL-CCNC: 28 U/L (ref 10–40)
BASOPHILS # BLD AUTO: 0.03 K/UL (ref 0–0.2)
BASOPHILS NFR BLD: 0.5 % (ref 0–1.9)
BILIRUB SERPL-MCNC: 0.1 MG/DL (ref 0.1–1)
BNP SERPL-MCNC: 110 PG/ML (ref 0–99)
BUN SERPL-MCNC: 5 MG/DL (ref 6–20)
CALCIUM SERPL-MCNC: 8.5 MG/DL (ref 8.7–10.5)
CHLORIDE SERPL-SCNC: 107 MMOL/L (ref 95–110)
CO2 SERPL-SCNC: 26 MMOL/L (ref 23–29)
CREAT SERPL-MCNC: 0.7 MG/DL (ref 0.5–1.4)
DIFFERENTIAL METHOD: ABNORMAL
EOSINOPHIL # BLD AUTO: 0.1 K/UL (ref 0–0.5)
EOSINOPHIL NFR BLD: 1.9 % (ref 0–8)
ERYTHROCYTE [DISTWIDTH] IN BLOOD BY AUTOMATED COUNT: 15 % (ref 11.5–14.5)
EST. GFR  (NO RACE VARIABLE): >60 ML/MIN/1.73 M^2
GLUCOSE SERPL-MCNC: 230 MG/DL (ref 70–110)
HCT VFR BLD AUTO: 36.6 % (ref 37–48.5)
HGB BLD-MCNC: 11.1 G/DL (ref 12–16)
IMM GRANULOCYTES # BLD AUTO: 0.03 K/UL (ref 0–0.04)
IMM GRANULOCYTES NFR BLD AUTO: 0.5 % (ref 0–0.5)
LYMPHOCYTES # BLD AUTO: 2.8 K/UL (ref 1–4.8)
LYMPHOCYTES NFR BLD: 45 % (ref 18–48)
MCH RBC QN AUTO: 21.3 PG (ref 27–31)
MCHC RBC AUTO-ENTMCNC: 30.3 G/DL (ref 32–36)
MCV RBC AUTO: 70 FL (ref 82–98)
MONOCYTES # BLD AUTO: 0.3 K/UL (ref 0.3–1)
MONOCYTES NFR BLD: 5.4 % (ref 4–15)
NEUTROPHILS # BLD AUTO: 2.9 K/UL (ref 1.8–7.7)
NEUTROPHILS NFR BLD: 46.7 % (ref 38–73)
NRBC BLD-RTO: 0 /100 WBC
PLATELET # BLD AUTO: 324 K/UL (ref 150–450)
PMV BLD AUTO: 9.4 FL (ref 9.2–12.9)
POTASSIUM SERPL-SCNC: 3.7 MMOL/L (ref 3.5–5.1)
PROT SERPL-MCNC: 6.1 G/DL (ref 6–8.4)
RBC # BLD AUTO: 5.2 M/UL (ref 4–5.4)
SODIUM SERPL-SCNC: 141 MMOL/L (ref 136–145)
TROPONIN I SERPL DL<=0.01 NG/ML-MCNC: <0.006 NG/ML (ref 0–0.03)
WBC # BLD AUTO: 6.25 K/UL (ref 3.9–12.7)

## 2023-05-12 PROCEDURE — 93010 EKG 12-LEAD: ICD-10-PCS | Mod: ,,, | Performed by: INTERNAL MEDICINE

## 2023-05-12 PROCEDURE — 85025 COMPLETE CBC W/AUTO DIFF WBC: CPT | Performed by: EMERGENCY MEDICINE

## 2023-05-12 PROCEDURE — 84484 ASSAY OF TROPONIN QUANT: CPT | Performed by: EMERGENCY MEDICINE

## 2023-05-12 PROCEDURE — 83880 ASSAY OF NATRIURETIC PEPTIDE: CPT | Performed by: EMERGENCY MEDICINE

## 2023-05-12 PROCEDURE — 99285 EMERGENCY DEPT VISIT HI MDM: CPT | Mod: 25

## 2023-05-12 PROCEDURE — 93005 ELECTROCARDIOGRAM TRACING: CPT

## 2023-05-12 PROCEDURE — 93010 ELECTROCARDIOGRAM REPORT: CPT | Mod: ,,, | Performed by: INTERNAL MEDICINE

## 2023-05-12 PROCEDURE — 80053 COMPREHEN METABOLIC PANEL: CPT | Performed by: EMERGENCY MEDICINE

## 2023-05-12 PROCEDURE — 25000003 PHARM REV CODE 250: Performed by: EMERGENCY MEDICINE

## 2023-05-12 RX ORDER — CYCLOBENZAPRINE HCL 5 MG
5 TABLET ORAL 3 TIMES DAILY PRN
Qty: 30 TABLET | Refills: 0 | Status: SHIPPED | OUTPATIENT
Start: 2023-05-12 | End: 2023-05-12 | Stop reason: SDUPTHER

## 2023-05-12 RX ORDER — METOPROLOL SUCCINATE 25 MG/1
25 TABLET, EXTENDED RELEASE ORAL ONCE
Status: COMPLETED | OUTPATIENT
Start: 2023-05-12 | End: 2023-05-12

## 2023-05-12 RX ORDER — PIOGLITAZONEHYDROCHLORIDE 15 MG/1
15 TABLET ORAL DAILY
Qty: 30 TABLET | Refills: 0 | Status: SHIPPED | OUTPATIENT
Start: 2023-05-12

## 2023-05-12 RX ORDER — ATORVASTATIN CALCIUM 40 MG/1
40 TABLET, FILM COATED ORAL DAILY
Qty: 30 TABLET | Refills: 0 | Status: SHIPPED | OUTPATIENT
Start: 2023-05-12 | End: 2024-01-31

## 2023-05-12 RX ORDER — CYCLOBENZAPRINE HCL 10 MG
10 TABLET ORAL 3 TIMES DAILY PRN
Qty: 30 TABLET | Refills: 0 | Status: SHIPPED | OUTPATIENT
Start: 2023-05-12

## 2023-05-12 RX ORDER — GLIPIZIDE 10 MG/1
10 TABLET ORAL
Qty: 60 TABLET | Refills: 0 | Status: SHIPPED | OUTPATIENT
Start: 2023-05-12 | End: 2023-06-11

## 2023-05-12 RX ORDER — CLONIDINE HYDROCHLORIDE 0.1 MG/1
0.1 TABLET ORAL
Status: COMPLETED | OUTPATIENT
Start: 2023-05-12 | End: 2023-05-12

## 2023-05-12 RX ORDER — FUROSEMIDE 20 MG/1
20 TABLET ORAL DAILY PRN
Qty: 10 TABLET | Refills: 0 | Status: SHIPPED | OUTPATIENT
Start: 2023-05-12

## 2023-05-12 RX ORDER — METOPROLOL SUCCINATE 25 MG/1
25 TABLET, EXTENDED RELEASE ORAL DAILY
Qty: 30 TABLET | Refills: 0 | Status: SHIPPED | OUTPATIENT
Start: 2023-05-12

## 2023-05-12 RX ORDER — METOPROLOL SUCCINATE 25 MG/1
25 TABLET, EXTENDED RELEASE ORAL DAILY
Qty: 30 TABLET | Refills: 0 | Status: SHIPPED | OUTPATIENT
Start: 2023-05-12 | End: 2023-05-12 | Stop reason: SDUPTHER

## 2023-05-12 RX ORDER — TRAMADOL HYDROCHLORIDE 50 MG/1
50 TABLET ORAL EVERY 6 HOURS PRN
Qty: 8 TABLET | Refills: 0 | Status: SHIPPED | OUTPATIENT
Start: 2023-05-12

## 2023-05-12 RX ORDER — OMEPRAZOLE 40 MG/1
40 CAPSULE, DELAYED RELEASE ORAL DAILY
Qty: 30 CAPSULE | Refills: 0 | Status: SHIPPED | OUTPATIENT
Start: 2023-05-12

## 2023-05-12 RX ADMIN — METOPROLOL SUCCINATE 25 MG: 25 TABLET, EXTENDED RELEASE ORAL at 11:05

## 2023-05-12 RX ADMIN — CLONIDINE HYDROCHLORIDE 0.1 MG: 0.1 TABLET ORAL at 11:05

## 2023-05-12 NOTE — ED PROVIDER NOTES
SCRIBE #1 NOTE: I, Melchor Black, am scribing for, and in the presence of, Broderick Bardales MD. I have scribed the entire note.       History     Chief Complaint   Patient presents with    Back Pain     Pt reports back and neck pain (L side) x several weeks pt denies injury    Leg Swelling     Bilateral leg swelling x several days     Review of patient's allergies indicates:   Allergen Reactions    Macrobid [nitrofurantoin monohyd/m-cryst] Hives    Nitrofurantoin macrocrystalline Hives and Itching         History of Present Illness     HPI    2023, 11:21 AM  History obtained from the patient      History of Present Illness: Sonali Harrison is a 53 y.o. female patient with a PMHx of GERD, DM, HTN, and arthritis who presents to the Emergency Department for evaluation of back/L shoulder pain which onset gradually several weeks ago. Pt states back feels tight/burning sensation when walking and L shoulder pain happens with movement. Pt also c/o of bilateral leg swelling which onset within the week. Pt states to be taking Metoprolol(25 mg, out - 1week), Atorvastatin(40 mg-out), Omeprazole (40 mg), Glipizide (10 mg 2x daily), Sucralfate (1 gm), Pioglitazone (15 mg), and Cyclobenzaprine (5 mg). Symptoms are constant and moderate in severity. No other associated sxs. Patient denies any SOB, and all other sxs at this time. Prior Tx. No further complaints or concerns at this time.       Arrival mode: Personal vehicle    PCP: Ev Rivera MD        Past Medical History:  Past Medical History:   Diagnosis Date    Arthritis     Depression     Diabetes mellitus     GERD (gastroesophageal reflux disease)     Hypertension     Insomnia     Panic attack        Past Surgical History:  Past Surgical History:   Procedure Laterality Date     SECTION      HYSTERECTOMY           Family History:  Family History   Problem Relation Age of Onset    Birth defects Neg Hx        Social History:  Social History     Tobacco Use  "   Smoking status: Every Day     Packs/day: 0.25     Types: Cigarettes    Smokeless tobacco: Never   Substance and Sexual Activity    Alcohol use: No    Drug use: No    Sexual activity: Not on file        Review of Systems     Review of Systems   Respiratory:  Negative for shortness of breath.    Cardiovascular:  Positive for leg swelling (bilat).   Musculoskeletal:  Positive for back pain.        (+) shoulder pain      Physical Exam     Initial Vitals [05/12/23 0925]   BP Pulse Resp Temp SpO2   (!) 179/89 98 18 98.3 °F (36.8 °C) 99 %      MAP       --          Physical Exam  Nursing Notes and Vital Signs Reviewed.  Constitutional: Patient is in mild distress. Well-developed and well-nourished.  Head: Atraumatic. Normocephalic.  Eyes: PERRL. EOM intact. Conjunctivae are not pale. No scleral icterus.  ENT: Mucous membranes are moist. Oropharynx is clear and symmetric.    Neck: Supple. Full ROM. No lymphadenopathy.  Cardiovascular: Regular rate. Regular rhythm. No murmurs, rubs, or gallops. Distal pulses are 2+ and symmetric.  Pulmonary/Chest: No respiratory distress. Clear to auscultation bilaterally. No wheezing or rales.  Abdominal: Soft and non-distended.  There is no tenderness.  No rebound, guarding, or rigidity. Good bowel sounds.  Genitourinary: No CVA tenderness  Musculoskeletal: Moves all extremities. No obvious deformities.  Bilateral lower extremity edema. No unilateral leg width discrepancy  Skin: Warm and dry.  Neurological:  Alert, awake, and appropriate.  Normal speech.  No acute focal neurological deficits are appreciated.  Psychiatric: Normal affect. Good eye contact. Appropriate in content.     ED Course   Procedures  ED Vital Signs:  Vitals:    05/12/23 0925 05/12/23 1040 05/12/23 1100 05/12/23 1200   BP: (!) 179/89  (!) 181/109 (!) 141/70   Pulse: 98 92 92 75   Resp: 18  (!) 24 19   Temp: 98.3 °F (36.8 °C)      TempSrc: Oral      SpO2: 99%  100% 100%   Weight: 113.4 kg (250 lb)      Height: 5' 10" " (1.778 m)          Abnormal Lab Results:  Labs Reviewed   COMPREHENSIVE METABOLIC PANEL - Abnormal; Notable for the following components:       Result Value    Glucose 230 (*)     BUN 5 (*)     Calcium 8.5 (*)     Albumin 3.0 (*)     All other components within normal limits   CBC W/ AUTO DIFFERENTIAL - Abnormal; Notable for the following components:    Hemoglobin 11.1 (*)     Hematocrit 36.6 (*)     MCV 70 (*)     MCH 21.3 (*)     MCHC 30.3 (*)     RDW 15.0 (*)     All other components within normal limits   B-TYPE NATRIURETIC PEPTIDE - Abnormal; Notable for the following components:     (*)     All other components within normal limits   TROPONIN I        All Lab Results:  Results for orders placed or performed during the hospital encounter of 05/12/23   Comprehensive metabolic panel   Result Value Ref Range    Sodium 141 136 - 145 mmol/L    Potassium 3.7 3.5 - 5.1 mmol/L    Chloride 107 95 - 110 mmol/L    CO2 26 23 - 29 mmol/L    Glucose 230 (H) 70 - 110 mg/dL    BUN 5 (L) 6 - 20 mg/dL    Creatinine 0.7 0.5 - 1.4 mg/dL    Calcium 8.5 (L) 8.7 - 10.5 mg/dL    Total Protein 6.1 6.0 - 8.4 g/dL    Albumin 3.0 (L) 3.5 - 5.2 g/dL    Total Bilirubin 0.1 0.1 - 1.0 mg/dL    Alkaline Phosphatase 88 55 - 135 U/L    AST 28 10 - 40 U/L    ALT 33 10 - 44 U/L    Anion Gap 8 8 - 16 mmol/L    eGFR >60 >60 mL/min/1.73 m^2   CBC auto differential   Result Value Ref Range    WBC 6.25 3.90 - 12.70 K/uL    RBC 5.20 4.00 - 5.40 M/uL    Hemoglobin 11.1 (L) 12.0 - 16.0 g/dL    Hematocrit 36.6 (L) 37.0 - 48.5 %    MCV 70 (L) 82 - 98 fL    MCH 21.3 (L) 27.0 - 31.0 pg    MCHC 30.3 (L) 32.0 - 36.0 g/dL    RDW 15.0 (H) 11.5 - 14.5 %    Platelets 324 150 - 450 K/uL    MPV 9.4 9.2 - 12.9 fL    Immature Granulocytes 0.5 0.0 - 0.5 %    Gran # (ANC) 2.9 1.8 - 7.7 K/uL    Immature Grans (Abs) 0.03 0.00 - 0.04 K/uL    Lymph # 2.8 1.0 - 4.8 K/uL    Mono # 0.3 0.3 - 1.0 K/uL    Eos # 0.1 0.0 - 0.5 K/uL    Baso # 0.03 0.00 - 0.20 K/uL    nRBC 0 0  /100 WBC    Gran % 46.7 38.0 - 73.0 %    Lymph % 45.0 18.0 - 48.0 %    Mono % 5.4 4.0 - 15.0 %    Eosinophil % 1.9 0.0 - 8.0 %    Basophil % 0.5 0.0 - 1.9 %    Differential Method Automated    Troponin I   Result Value Ref Range    Troponin I <0.006 0.000 - 0.026 ng/mL   Brain natriuretic peptide   Result Value Ref Range     (H) 0 - 99 pg/mL        Imaging Results:  Imaging Results              X-Ray Chest AP Portable (Final result)  Result time 05/12/23 12:17:39      Final result by Broderick Nowak MD (05/12/23 12:17:39)                   Impression:      No acute process seen.      Electronically signed by: Broderick Nowak MD  Date:    05/12/2023  Time:    12:17               Narrative:    EXAMINATION:  XR CHEST AP PORTABLE    CLINICAL HISTORY:  pedal edema;    FINDINGS:  Single view of the chest.  Comparison 5/13/5.    Cardiac silhouette is normal.  The lungs demonstrate no evidence of active disease.  No evidence of pleural effusion or pneumothorax.  Bones appear intact.                                       The EKG was ordered, reviewed, and independently interpreted by the ED provider.  Interpretation time: 11:53  Rate: 78 BPM  Rhythm: normal sinus rhythm  Interpretation: No acute ST changes. No STEMI.           The Emergency Provider reviewed the vital signs and test results, which are outlined above.     ED Discussion     12:42 PM: Reassessed pt at this time. Discussed with pt all pertinent ED information and results. Discussed pt dx and plan of tx. Gave pt all f/u and return to the ED instructions. All questions and concerns were addressed at this time. Pt expresses understanding of information and instructions, and is comfortable with plan to discharge. Pt is stable for discharge.    I discussed with patient and/or family/caretaker that evaluation in the ED does not suggest any emergent or life threatening medical conditions requiring immediate intervention beyond what was provided in the ED, and I  believe patient is safe for discharge.  Regardless, an unremarkable evaluation in the ED does not preclude the development or presence of a serious of life threatening condition. As such, patient was instructed to return immediately for any worsening or change in current symptoms.        Medical Decision Making:   History:   Old Medical Records: I decided to obtain old medical records.  Old Records Summarized: other records.       <> Summary of Records: Medication list reviewed.  This does not match up with patient's medications as he has here in the emergency department  Independently Interpreted Test(s):   I have ordered and independently interpreted X-rays - see prior notes.  I have ordered and independently interpreted EKG Reading(s) - see prior notes  Clinical Tests:   Lab Tests: Ordered and Reviewed  Radiological Study: Ordered and Reviewed  Medical Tests: Ordered and Reviewed  Patient presented to the emergency department with multiple chronic complaints of arthralgias, low back pain, and needing refills on all of her medications.  She states that her primary care physician is no longer prescribing her medications and she was referred to someone else.  She is out of her metoprolol and only has a couple days left of her other medications.  She is requesting her Flexeril be increased from 5 mg 10 mg and is also requesting a prescription of T tramadol stating that this has helped in the past.  Insufficient relief from over-the-counter medications in her 5 mg of Flexeril.  Medications refilled.  Discussed elevating the feet, compression socks, and will prescribe temporary p.r.n. low dose of Lasix to be used as needed until she can follow up with the primary care physician         ED Medication(s):  Medications   metoprolol succinate (TOPROL-XL) 24 hr tablet 25 mg (25 mg Oral Given 5/12/23 1143)   cloNIDine tablet 0.1 mg (0.1 mg Oral Given 5/12/23 1143)       New Prescriptions    ATORVASTATIN (LIPITOR) 40 MG  TABLET    Take 1 tablet (40 mg total) by mouth once daily.    CYCLOBENZAPRINE (FLEXERIL) 10 MG TABLET    Take 1 tablet (10 mg total) by mouth 3 (three) times daily as needed for Muscle spasms.    FUROSEMIDE (LASIX) 20 MG TABLET    Take 1 tablet (20 mg total) by mouth daily as needed (leg swelling).    GLIPIZIDE (GLUCOTROL) 10 MG TABLET    Take 1 tablet (10 mg total) by mouth 2 (two) times daily before meals.    METOPROLOL SUCCINATE (TOPROL-XL) 25 MG 24 HR TABLET    Take 1 tablet (25 mg total) by mouth once daily.    OMEPRAZOLE (PRILOSEC) 40 MG CAPSULE    Take 1 capsule (40 mg total) by mouth once daily.    PIOGLITAZONE (ACTOS) 15 MG TABLET    Take 1 tablet (15 mg total) by mouth once daily.    TRAMADOL (ULTRAM) 50 MG TABLET    Take 1 tablet (50 mg total) by mouth every 6 (six) hours as needed for Pain.        Follow-up Information       Follow-up with primary care physician.               [facility] - Emergency Dept..    Specialty: Emergency Medicine  Why: As needed, If symptoms worsen  Contact information:  [address] [facility] [address] [phone]                               Scribe Attestation:   Scribe #1: I performed the above scribed service and the documentation accurately describes the services I performed. I attest to the accuracy of the note.     Attending:   Physician Attestation Statement for Scribe #1: I, Broderick Bardales MD, personally performed the services described in this documentation, as scribed by Melchor Black, in my presence, and it is both accurate and complete.           Clinical Impression       ICD-10-CM ICD-9-CM   1. Encounter for medication refill  Z76.0 V68.1   2. Pedal edema  R60.0 782.3   3. Chronic low back pain without sciatica, unspecified back pain laterality  M54.50 724.2    G89.29 338.29   4. Hyperglycemia  R73.9 790.29   5. Essential hypertension  I10 401.9       Disposition:   Disposition: Discharged  Condition: Stable      Broderick Bardales MD  05/12/23  0780

## 2023-07-21 ENCOUNTER — HOSPITAL ENCOUNTER (EMERGENCY)
Facility: HOSPITAL | Age: 53
Discharge: HOME | End: 2023-07-21
Attending: EMERGENCY MEDICINE
Payer: MEDICAID

## 2023-07-21 VITALS
TEMPERATURE: 99 F | WEIGHT: 239.31 LBS | SYSTOLIC BLOOD PRESSURE: 133 MMHG | DIASTOLIC BLOOD PRESSURE: 82 MMHG | BODY MASS INDEX: 34.34 KG/M2 | RESPIRATION RATE: 20 BRPM | HEART RATE: 102 BPM | OXYGEN SATURATION: 98 %

## 2023-07-21 DIAGNOSIS — S49.92XA INJURY OF LEFT SHOULDER, INITIAL ENCOUNTER: ICD-10-CM

## 2023-07-21 DIAGNOSIS — S80.01XA CONTUSION OF RIGHT KNEE, INITIAL ENCOUNTER: Primary | ICD-10-CM
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
## 2023-07-21 PROCEDURE — 99284 EMERGENCY DEPT VISIT MOD MDM: CPT

## 2023-07-21 RX ORDER — ACETAMINOPHEN AND CODEINE PHOSPHATE 300; 30 MG/1; MG/1
1 TABLET ORAL EVERY 6 HOURS PRN
Qty: 10 TABLET | Refills: 0 | Status: SHIPPED | OUTPATIENT
Start: 2023-07-21 | End: 2023-07-31

## 2023-07-22 NOTE — ED PROVIDER NOTES
History      Chief Complaint   Patient presents with    Fall     Pt complaining of pain to the R knee and L shoulder after tripping over curb today       Review of patient's allergies indicates:   Allergen Reactions    Macrobid [nitrofurantoin monohyd/m-cryst] Hives    Nitrofurantoin macrocrystalline Hives and Itching        HPI   HPI    2023, 10:13 AM   History obtained from the patient      History of Present Illness: Sonali Harrison is a 53 y.o. female patient who presents to the Emergency Department for right knee and left shoulder pain since trip over a curb today.   Able to weight bear. Denies fever.  Symptoms are moderate in severity.     No further complaints or concerns at this time.           PCP: Ev Rivera MD       Past Medical History:  Past Medical History:   Diagnosis Date    Arthritis     Depression     Diabetes mellitus     GERD (gastroesophageal reflux disease)     Hypertension     Insomnia     Panic attack          Past Surgical History:  Past Surgical History:   Procedure Laterality Date     SECTION      HYSTERECTOMY             Family History:  Family History   Problem Relation Age of Onset    Birth defects Neg Hx            Social History:  Social History     Tobacco Use    Smoking status: Every Day     Packs/day: 0.25     Types: Cigarettes    Smokeless tobacco: Never   Substance and Sexual Activity    Alcohol use: No    Drug use: No    Sexual activity: Not on file       ROS     Review of Systems   Musculoskeletal:  Positive for arthralgias.     Physical Exam      Initial Vitals [23 0937]   BP Pulse Resp Temp SpO2   133/82 102 20 98.7 °F (37.1 °C) 98 %      MAP       --         Physical Exam  Vital signs and nursing notes reviewed.  Constitutional: Patient is in NAD. Awake and alert. Well-developed and well-nourished.  Head: Atraumatic. Normocephalic.  Eyes: PERRL. EOM intact. Conjunctivae nl. No scleral icterus.  ENT: Mucous membranes are moist.   Neck:  Supple. No JVD. No lymphadenopathy.  No meningismus  Cardiovascular: Regular rate and rhythm. No murmurs, rubs, or gallops. Distal pulses are 2+ and symmetric.  Pulmonary/Chest: No respiratory distress. Clear to auscultation bilaterally. No wheezing, rales, or rhonchi.  Abdominal: Soft. Non-distended. No TTP. No rebound, guarding, or rigidity. Good bowel sounds.  Genitourinary: No CVA tenderness  Musculoskeletal: Moves all extremities.  No spinal tenderness.  Left shoulder with full range of motion, lateral tenderness.  Right knee with no erythema, edema or heat.  FROM.  No laxity.  Mild anterior tenderness.   2+DP pulses.  Hip and ankle with from and nontender.    Skin: Warm and dry.  Neurological: Awake and alert. No acute focal neurological deficits are appreciated.  Psychiatric: Normal affect. Good eye contact. Appropriate in content.      ED Course          Procedures  ED Vital Signs:  Vitals:    07/21/23 0937   BP: 133/82   Pulse: 102   Resp: 20   Temp: 98.7 °F (37.1 °C)   TempSrc: Oral   SpO2: 98%   Weight: 108.6 kg (239 lb 5 oz)                 Imaging Results:  Imaging Results              X-Ray Knee Complete 4 Or More Views Right (Final result)  Result time 07/21/23 10:21:18      Final result by Wei Ramirez MD (07/21/23 10:21:18)                   Impression:      Tricompartment osteoarthritis.  No fracture or dislocation.      Electronically signed by: Wei Ramirez MD  Date:    07/21/2023  Time:    10:21               Narrative:    EXAMINATION:  XR KNEE COMP 4 OR MORE VIEWS RIGHT    CLINICAL HISTORY:  Fall on same level from slipping, tripping and stumbling without subsequent striking against object, initial encounter    TECHNIQUE:  Four views of the right knee are obtained.    COMPARISON:  None    FINDINGS:  Osteophytes are seen in all 3 compartments.  There is medial compartment joint space narrowing.  No fracture or dislocation or joint effusion.                                       X-Ray Shoulder  Trauma Left (Final result)  Result time 07/21/23 10:14:05      Final result by Broderick Nowak MD (07/21/23 10:14:05)                   Impression:      No acute fracture or dislocation.      Electronically signed by: Broderick Nowak MD  Date:    07/21/2023  Time:    10:14               Narrative:    EXAMINATION:  XR SHOULDER TRAUMA 3 VIEW LEFT    CLINICAL HISTORY:  XR SHOULDER TRAUMA 3 VIEW LEFTFall on same level from slipping, tripping and stumbling without subsequent striking against object, initial encounter    COMPARISON:  04/28/2023    FINDINGS:  Three views of the left shoulder were obtained.    No evidence of acute fracture or dislocation.  Mild osteopenia.  Mild AC joint degenerative change.  Soft tissues are unremarkable.                                         The Emergency Provider reviewed the vital signs and test results, which are outlined above.    ED Discussion             Medication(s) given in the ER:  Medications - No data to display         Follow-up Information       Ev Rivera MD In 2 days.    Specialty: Family Medicine  Contact information:  [address] [place] [address]  [facility] [address]  [phone]                                 Discharge Medication List as of 7/21/2023 11:27 AM        START taking these medications    Details   acetaminophen-codeine 300-30mg (TYLENOL #3) 300-30 mg Tab Take 1 tablet by mouth every 6 (six) hours as needed., Starting Fri 7/21/2023, Until Mon 7/31/2023 at 2359, Normal                Medical Decision Making        All findings were reviewed with the patient/family in detail.   All remaining questions and concerns were addressed at that time.  Patient/family has been counseled regarding the need for follow-up as well as the indication to return to the emergency room should new or worrisome developments occur.        MDM                 Clinical Impression:        ICD-10-CM ICD-9-CM   1. Contusion of right knee, initial encounter   S80.01XA 924.11   2. Fall from slip, trip, or stumble, initial encounter  W01.0XXA E885.9   3. Injury of left shoulder, initial encounter  S49.92XA 959.2               Luisana Almeida PA-C  07/22/23 1023

## 2024-01-31 ENCOUNTER — OFFICE VISIT (OUTPATIENT)
Dept: PRIMARY CARE CLINIC | Facility: CLINIC | Age: 54
End: 2024-01-31
Payer: MEDICAID

## 2024-01-31 ENCOUNTER — LAB VISIT (OUTPATIENT)
Dept: LAB | Facility: HOSPITAL | Age: 54
End: 2024-01-31
Attending: NURSE PRACTITIONER
Payer: MEDICAID

## 2024-01-31 VITALS
HEART RATE: 93 BPM | TEMPERATURE: 99 F | WEIGHT: 228.19 LBS | DIASTOLIC BLOOD PRESSURE: 58 MMHG | HEIGHT: 70 IN | BODY MASS INDEX: 32.67 KG/M2 | SYSTOLIC BLOOD PRESSURE: 111 MMHG | OXYGEN SATURATION: 97 %

## 2024-01-31 DIAGNOSIS — Z01.419 WELL WOMAN EXAM: ICD-10-CM

## 2024-01-31 DIAGNOSIS — M54.42 CHRONIC LEFT-SIDED LOW BACK PAIN WITH BILATERAL SCIATICA: ICD-10-CM

## 2024-01-31 DIAGNOSIS — G89.29 CHRONIC LEFT-SIDED LOW BACK PAIN WITH BILATERAL SCIATICA: ICD-10-CM

## 2024-01-31 DIAGNOSIS — R93.7 BONE MARROW EDEMA: ICD-10-CM

## 2024-01-31 DIAGNOSIS — Z13.6 ENCOUNTER FOR LIPID SCREENING FOR CARDIOVASCULAR DISEASE: ICD-10-CM

## 2024-01-31 DIAGNOSIS — Z11.4 SCREENING FOR HIV (HUMAN IMMUNODEFICIENCY VIRUS): ICD-10-CM

## 2024-01-31 DIAGNOSIS — Z11.59 ENCOUNTER FOR HEPATITIS C SCREENING TEST FOR LOW RISK PATIENT: ICD-10-CM

## 2024-01-31 DIAGNOSIS — M48.061 FORAMINAL STENOSIS OF LUMBAR REGION: Primary | ICD-10-CM

## 2024-01-31 DIAGNOSIS — Z00.00 GENERAL MEDICAL EXAM: ICD-10-CM

## 2024-01-31 DIAGNOSIS — Z13.220 ENCOUNTER FOR LIPID SCREENING FOR CARDIOVASCULAR DISEASE: ICD-10-CM

## 2024-01-31 DIAGNOSIS — M54.41 CHRONIC LEFT-SIDED LOW BACK PAIN WITH BILATERAL SCIATICA: ICD-10-CM

## 2024-01-31 DIAGNOSIS — Z13.1 SCREENING FOR DIABETES MELLITUS: ICD-10-CM

## 2024-01-31 LAB
ALBUMIN SERPL BCP-MCNC: 3.4 G/DL (ref 3.5–5.2)
ALP SERPL-CCNC: 80 U/L (ref 55–135)
ALT SERPL W/O P-5'-P-CCNC: 46 U/L (ref 10–44)
ANION GAP SERPL CALC-SCNC: 8 MMOL/L (ref 8–16)
AST SERPL-CCNC: 57 U/L (ref 10–40)
BASOPHILS # BLD AUTO: 0.04 K/UL (ref 0–0.2)
BASOPHILS NFR BLD: 0.4 % (ref 0–1.9)
BILIRUB SERPL-MCNC: 0.2 MG/DL (ref 0.1–1)
BUN SERPL-MCNC: 9 MG/DL (ref 6–20)
CALCIUM SERPL-MCNC: 9 MG/DL (ref 8.7–10.5)
CHLORIDE SERPL-SCNC: 108 MMOL/L (ref 95–110)
CHOLEST SERPL-MCNC: 170 MG/DL (ref 120–199)
CHOLEST/HDLC SERPL: 4.6 {RATIO} (ref 2–5)
CO2 SERPL-SCNC: 23 MMOL/L (ref 23–29)
CREAT SERPL-MCNC: 0.8 MG/DL (ref 0.5–1.4)
DIFFERENTIAL METHOD BLD: ABNORMAL
EOSINOPHIL # BLD AUTO: 0.1 K/UL (ref 0–0.5)
EOSINOPHIL NFR BLD: 0.6 % (ref 0–8)
ERYTHROCYTE [DISTWIDTH] IN BLOOD BY AUTOMATED COUNT: 17 % (ref 11.5–14.5)
EST. GFR  (NO RACE VARIABLE): >60 ML/MIN/1.73 M^2
ESTIMATED AVG GLUCOSE: 295 MG/DL (ref 68–131)
GLUCOSE SERPL-MCNC: 228 MG/DL (ref 70–110)
HBA1C MFR BLD: 11.9 % (ref 4–5.6)
HCT VFR BLD AUTO: 45.6 % (ref 37–48.5)
HCV AB SERPL QL IA: NORMAL
HDLC SERPL-MCNC: 37 MG/DL (ref 40–75)
HDLC SERPL: 21.8 % (ref 20–50)
HGB BLD-MCNC: 13.6 G/DL (ref 12–16)
HIV 1+2 AB+HIV1 P24 AG SERPL QL IA: NORMAL
IMM GRANULOCYTES # BLD AUTO: 0.02 K/UL (ref 0–0.04)
IMM GRANULOCYTES NFR BLD AUTO: 0.2 % (ref 0–0.5)
LDLC SERPL CALC-MCNC: 95.4 MG/DL (ref 63–159)
LYMPHOCYTES # BLD AUTO: 3.2 K/UL (ref 1–4.8)
LYMPHOCYTES NFR BLD: 33.2 % (ref 18–48)
MCH RBC QN AUTO: 22 PG (ref 27–31)
MCHC RBC AUTO-ENTMCNC: 29.8 G/DL (ref 32–36)
MCV RBC AUTO: 74 FL (ref 82–98)
MONOCYTES # BLD AUTO: 0.6 K/UL (ref 0.3–1)
MONOCYTES NFR BLD: 6 % (ref 4–15)
NEUTROPHILS # BLD AUTO: 5.8 K/UL (ref 1.8–7.7)
NEUTROPHILS NFR BLD: 59.6 % (ref 38–73)
NONHDLC SERPL-MCNC: 133 MG/DL
NRBC BLD-RTO: 0 /100 WBC
PLATELET # BLD AUTO: 463 K/UL (ref 150–450)
PMV BLD AUTO: 10.3 FL (ref 9.2–12.9)
POTASSIUM SERPL-SCNC: 4.1 MMOL/L (ref 3.5–5.1)
PROT SERPL-MCNC: 6.8 G/DL (ref 6–8.4)
RBC # BLD AUTO: 6.17 M/UL (ref 4–5.4)
SODIUM SERPL-SCNC: 139 MMOL/L (ref 136–145)
T3FREE SERPL-MCNC: 2.7 PG/ML (ref 2.3–4.2)
T4 FREE SERPL-MCNC: 0.84 NG/DL (ref 0.71–1.51)
TRIGL SERPL-MCNC: 188 MG/DL (ref 30–150)
TSH SERPL DL<=0.005 MIU/L-ACNC: 0.49 UIU/ML (ref 0.4–4)
WBC # BLD AUTO: 9.77 K/UL (ref 3.9–12.7)

## 2024-01-31 PROCEDURE — 85025 COMPLETE CBC W/AUTO DIFF WBC: CPT | Performed by: NURSE PRACTITIONER

## 2024-01-31 PROCEDURE — 80061 LIPID PANEL: CPT | Performed by: NURSE PRACTITIONER

## 2024-01-31 PROCEDURE — 99999 PR PBB SHADOW E&M-EST. PATIENT-LVL V: CPT | Mod: PBBFAC,,, | Performed by: NURSE PRACTITIONER

## 2024-01-31 PROCEDURE — 3078F DIAST BP <80 MM HG: CPT | Mod: CPTII,,, | Performed by: NURSE PRACTITIONER

## 2024-01-31 PROCEDURE — 87389 HIV-1 AG W/HIV-1&-2 AB AG IA: CPT | Performed by: NURSE PRACTITIONER

## 2024-01-31 PROCEDURE — 83036 HEMOGLOBIN GLYCOSYLATED A1C: CPT | Performed by: NURSE PRACTITIONER

## 2024-01-31 PROCEDURE — 99215 OFFICE O/P EST HI 40 MIN: CPT | Mod: PBBFAC,PN | Performed by: NURSE PRACTITIONER

## 2024-01-31 PROCEDURE — 84443 ASSAY THYROID STIM HORMONE: CPT | Performed by: NURSE PRACTITIONER

## 2024-01-31 PROCEDURE — 36415 COLL VENOUS BLD VENIPUNCTURE: CPT | Mod: PN | Performed by: NURSE PRACTITIONER

## 2024-01-31 PROCEDURE — 99396 PREV VISIT EST AGE 40-64: CPT | Mod: S$PBB,,, | Performed by: NURSE PRACTITIONER

## 2024-01-31 PROCEDURE — 86803 HEPATITIS C AB TEST: CPT | Performed by: NURSE PRACTITIONER

## 2024-01-31 PROCEDURE — 3074F SYST BP LT 130 MM HG: CPT | Mod: CPTII,,, | Performed by: NURSE PRACTITIONER

## 2024-01-31 PROCEDURE — 80053 COMPREHEN METABOLIC PANEL: CPT | Performed by: NURSE PRACTITIONER

## 2024-01-31 PROCEDURE — 3008F BODY MASS INDEX DOCD: CPT | Mod: CPTII,,, | Performed by: NURSE PRACTITIONER

## 2024-01-31 PROCEDURE — 1159F MED LIST DOCD IN RCRD: CPT | Mod: CPTII,,, | Performed by: NURSE PRACTITIONER

## 2024-01-31 PROCEDURE — 84481 FREE ASSAY (FT-3): CPT | Performed by: NURSE PRACTITIONER

## 2024-01-31 PROCEDURE — 84439 ASSAY OF FREE THYROXINE: CPT | Performed by: NURSE PRACTITIONER

## 2024-01-31 PROCEDURE — 3046F HEMOGLOBIN A1C LEVEL >9.0%: CPT | Mod: CPTII,,, | Performed by: NURSE PRACTITIONER

## 2024-01-31 RX ORDER — INSULIN DETEMIR 100 [IU]/ML
INJECTION, SOLUTION SUBCUTANEOUS
COMMUNITY
Start: 2023-05-01

## 2024-01-31 RX ORDER — DIAZEPAM 10 MG/1
TABLET ORAL
COMMUNITY

## 2024-01-31 RX ORDER — FAMOTIDINE 40 MG/1
1 TABLET, FILM COATED ORAL DAILY
COMMUNITY

## 2024-01-31 RX ORDER — CYPROHEPTADINE HYDROCHLORIDE 4 MG/1
TABLET ORAL 2 TIMES DAILY
COMMUNITY

## 2024-01-31 RX ORDER — BUPROPION HYDROCHLORIDE 150 MG/1
TABLET ORAL
COMMUNITY

## 2024-01-31 RX ORDER — ALBUTEROL SULFATE 90 UG/1
AEROSOL, METERED RESPIRATORY (INHALATION)
COMMUNITY

## 2024-01-31 RX ORDER — HYDROCODONE BITARTRATE AND ACETAMINOPHEN 7.5; 325 MG/1; MG/1
TABLET ORAL
COMMUNITY

## 2024-01-31 RX ORDER — IBUPROFEN 600 MG/1
TABLET ORAL
COMMUNITY

## 2024-01-31 RX ORDER — DULAGLUTIDE 0.75 MG/.5ML
INJECTION, SOLUTION SUBCUTANEOUS
COMMUNITY

## 2024-01-31 RX ORDER — EMPAGLIFLOZIN 25 MG/1
25 TABLET, FILM COATED ORAL
COMMUNITY
Start: 2024-01-30

## 2024-01-31 RX ORDER — ALPRAZOLAM 2 MG/1
1 TABLET ORAL DAILY
COMMUNITY

## 2024-05-12 NOTE — PROGRESS NOTES
Subjective:       Patient ID: Sonali Harrison is a 53 y.o. female.    Chief Complaint: Annual Exam, Establish Care, and Generalized Body Aches      History of Present Illness:   Sonali Harrison 53 y.o. female presents today with reports of low back pain that has been present for over 6 months but has not been able to go to Neuro-surgeon due to transportation issues. Patient denies any other problems or concerns at this time. Treatment options and alternatives were discussed with the patient. Patient provided opportunity to ask additional questions.  All questions were answered. Voices understanding and acceptance of this advice. Instructed to call back if any further questions or concerns.       Past Medical History:   Diagnosis Date    Arthritis     Depression     Diabetes mellitus     GERD (gastroesophageal reflux disease)     Hypertension     Insomnia     Panic attack      Family History   Problem Relation Age of Onset    Birth defects Neg Hx      Social History     Socioeconomic History    Marital status: Single   Tobacco Use    Smoking status: Every Day     Current packs/day: 0.25     Types: Cigarettes    Smokeless tobacco: Never   Substance and Sexual Activity    Alcohol use: No    Drug use: No     Outpatient Encounter Medications as of 1/31/2024   Medication Sig Dispense Refill    albuterol (PROVENTIL/VENTOLIN HFA) 90 mcg/actuation inhaler Inhale into the lungs.      ALPRAZolam (XANAX) 2 MG Tab Take 1 tablet by mouth once daily.      amitriptyline (ELAVIL) 100 MG tablet Take 1 tablet (100 mg total) by mouth every evening. 30 tablet 0    atorvastatin (LIPITOR) 40 MG tablet Take 1 tablet (40 mg total) by mouth once daily. 30 tablet 0    buPROPion (WELLBUTRIN XL) 150 MG TB24 tablet       butalbital-acetaminophen-caffeine -40 mg (FIORICET, ESGIC) -40 mg per tablet Take 1 tablet by mouth every 6 (six) hours as needed for Pain. 15 tablet 0    clonazePAM (KLONOPIN) 1 MG tablet Take 1  tablet (1 mg total) by mouth 2 (two) times daily as needed for Anxiety. 12 tablet 0    cyproheptadine (PERIACTIN) 4 mg tablet Take by mouth 2 (two) times daily.      diazePAM (VALIUM) 10 MG Tab       dulaglutide (TRULICITY) 0.75 mg/0.5 mL pen injector Inject 0.75 mg every week by subcutaneous route.      famotidine (PEPCID) 40 MG tablet Take 1 tablet by mouth once daily.      HYDROcodone-acetaminophen (NORCO) 7.5-325 mg per tablet       ibuprofen (ADVIL,MOTRIN) 600 MG tablet Take 1 tablet every day by oral route as needed.      insulin detemir U-100, Levemir, (LEVEMIR FLEXPEN) 100 unit/mL (3 mL) InPn pen INJECT 90 UNITS SUB-Q AT BEDTIME AS DIRECTED FOR DIABETES      JARDIANCE 25 mg tablet Take 25 mg by mouth.      meclizine (ANTIVERT) 25 mg tablet Take 1 tablet (25 mg total) by mouth every 6 (six) hours as needed for Dizziness or Nausea. 15 tablet 0    metoprolol succinate (TOPROL-XL) 25 MG 24 hr tablet Take 1 tablet (25 mg total) by mouth once daily. 30 tablet 0    omeprazole (PRILOSEC) 20 MG capsule Take 1 capsule (20 mg total) by mouth once daily. 30 capsule 0    omeprazole (PRILOSEC) 40 MG capsule Take 1 capsule (40 mg total) by mouth once daily. 30 capsule 0    pioglitazone (ACTOS) 15 MG tablet Take 1 tablet (15 mg total) by mouth once daily. 30 tablet 0    QUEtiapine (SEROQUEL) 300 MG Tab Take 2 tablets (600 mg total) by mouth once daily. 60 tablet 0    traMADoL (ULTRAM) 50 mg tablet Take 1 tablet (50 mg total) by mouth every 6 (six) hours as needed for Pain. 8 tablet 0    clindamycin (CLEOCIN) 300 MG capsule Take 300 mg by mouth every 6 (six) hours.      cyclobenzaprine (FLEXERIL) 10 MG tablet Take 1 tablet (10 mg total) by mouth nightly as needed. (Patient not taking: Reported on 1/31/2024) 15 tablet 0    cyclobenzaprine (FLEXERIL) 10 MG tablet Take 1 tablet (10 mg total) by mouth 3 (three) times daily as needed for Muscle spasms. (Patient not taking: Reported on 1/31/2024) 30 tablet 0    FLUoxetine 10 MG  Tab Take 10 mg by mouth once daily.      fluticasone (FLONASE) 50 mcg/actuation nasal spray 1 spray by Each Nare route 2 (two) times daily as needed. (Patient not taking: Reported on 1/31/2024) 15 g 0    furosemide (LASIX) 20 MG tablet Take 1 tablet (20 mg total) by mouth daily as needed (leg swelling). (Patient not taking: Reported on 1/31/2024) 10 tablet 0    glimepiride (AMARYL) 2 MG tablet Take 2 mg by mouth before breakfast.      glipiZIDE (GLUCOTROL) 10 MG tablet Take 1 tablet (10 mg total) by mouth 2 (two) times daily before meals. 60 tablet 0    losartan (COZAAR) 50 MG tablet Take 50 mg by mouth once daily.      metformin (GLUCOPHAGE) 500 MG tablet Take 1 tablet (500 mg total) by mouth daily with breakfast. (Patient not taking: Reported on 1/31/2024) 30 tablet 0    orphenadrine (NORFLEX) 100 mg tablet Take 1 tablet (100 mg total) by mouth 2 (two) times daily. (Patient not taking: Reported on 1/31/2024) 12 tablet 0    promethazine-dextromethorphan (PROMETHAZINE-DM) 6.25-15 mg/5 mL Syrp 1 tsp PO every 6 hours prn cough (Patient not taking: Reported on 1/31/2024) 118 mL 0     No facility-administered encounter medications on file as of 1/31/2024.       Review of Systems   Constitutional:  Negative for appetite change, chills and fever.   HENT:  Negative for ear pain, sinus pressure, sore throat and trouble swallowing.    Eyes:  Negative for visual disturbance.   Respiratory:  Negative for shortness of breath.    Cardiovascular:  Negative for chest pain.   Gastrointestinal:  Negative for abdominal pain, diarrhea, nausea and vomiting.   Endocrine: Negative for cold intolerance, polyphagia and polyuria.   Genitourinary:  Negative for decreased urine volume and dysuria.   Musculoskeletal:  Negative for back pain.   Skin:  Negative for rash.   Allergic/Immunologic: Negative for environmental allergies and food allergies.   Neurological:  Negative for dizziness, tremors, weakness and numbness.   Hematological:  Does  "not bruise/bleed easily.   Psychiatric/Behavioral:  Negative for confusion and hallucinations. The patient is not nervous/anxious and is not hyperactive.    All other systems reviewed and are negative.      Objective:      BP (!) 111/58 (BP Location: Left arm, Patient Position: Sitting, BP Method: Large (Manual))   Pulse 93   Temp 98.8 °F (37.1 °C) (Oral)   Ht 5' 10" (1.778 m)   Wt 103.5 kg (228 lb 3.2 oz)   SpO2 97%   BMI 32.74 kg/m²   Physical Exam  Constitutional:       Appearance: She is well-developed.   HENT:      Head: Normocephalic.      Right Ear: Tympanic membrane normal.      Left Ear: Tympanic membrane normal.      Nose: Nose normal.   Eyes:      Pupils: Pupils are equal, round, and reactive to light.   Cardiovascular:      Rate and Rhythm: Normal rate.      Heart sounds: Normal heart sounds.   Pulmonary:      Effort: Pulmonary effort is normal.      Breath sounds: Normal breath sounds.   Abdominal:      General: Bowel sounds are normal.      Palpations: Abdomen is soft.   Musculoskeletal:         General: Normal range of motion.   Skin:     General: Skin is warm and dry.   Neurological:      Mental Status: She is alert and oriented to person, place, and time.   Psychiatric:         Behavior: Behavior normal.         Results for orders placed or performed during the hospital encounter of 05/12/23   Comprehensive metabolic panel   Result Value Ref Range    Sodium 141 136 - 145 mmol/L    Potassium 3.7 3.5 - 5.1 mmol/L    Chloride 107 95 - 110 mmol/L    CO2 26 23 - 29 mmol/L    Glucose 230 (H) 70 - 110 mg/dL    BUN 5 (L) 6 - 20 mg/dL    Creatinine 0.7 0.5 - 1.4 mg/dL    Calcium 8.5 (L) 8.7 - 10.5 mg/dL    Total Protein 6.1 6.0 - 8.4 g/dL    Albumin 3.0 (L) 3.5 - 5.2 g/dL    Total Bilirubin 0.1 0.1 - 1.0 mg/dL    Alkaline Phosphatase 88 55 - 135 U/L    AST 28 10 - 40 U/L    ALT 33 10 - 44 U/L    Anion Gap 8 8 - 16 mmol/L    eGFR >60 >60 mL/min/1.73 m^2   CBC auto differential   Result Value Ref Range "    WBC 6.25 3.90 - 12.70 K/uL    RBC 5.20 4.00 - 5.40 M/uL    Hemoglobin 11.1 (L) 12.0 - 16.0 g/dL    Hematocrit 36.6 (L) 37.0 - 48.5 %    MCV 70 (L) 82 - 98 fL    MCH 21.3 (L) 27.0 - 31.0 pg    MCHC 30.3 (L) 32.0 - 36.0 g/dL    RDW 15.0 (H) 11.5 - 14.5 %    Platelets 324 150 - 450 K/uL    MPV 9.4 9.2 - 12.9 fL    Immature Granulocytes 0.5 0.0 - 0.5 %    Gran # (ANC) 2.9 1.8 - 7.7 K/uL    Immature Grans (Abs) 0.03 0.00 - 0.04 K/uL    Lymph # 2.8 1.0 - 4.8 K/uL    Mono # 0.3 0.3 - 1.0 K/uL    Eos # 0.1 0.0 - 0.5 K/uL    Baso # 0.03 0.00 - 0.20 K/uL    nRBC 0 0 /100 WBC    Gran % 46.7 38.0 - 73.0 %    Lymph % 45.0 18.0 - 48.0 %    Mono % 5.4 4.0 - 15.0 %    Eosinophil % 1.9 0.0 - 8.0 %    Basophil % 0.5 0.0 - 1.9 %    Differential Method Automated    Troponin I   Result Value Ref Range    Troponin I <0.006 0.000 - 0.026 ng/mL   Brain natriuretic peptide   Result Value Ref Range     (H) 0 - 99 pg/mL     Assessment:       1. Foraminal stenosis of lumbar region    2. Bone marrow edema    3. Chronic left-sided low back pain with bilateral sciatica    4. General medical exam    5. Encounter for lipid screening for cardiovascular disease    6. Screening for diabetes mellitus    7. Screening for HIV (human immunodeficiency virus)    8. Encounter for hepatitis C screening test for low risk patient    9. Well woman exam        Plan:   Foraminal stenosis of lumbar region  -     Ambulatory referral/consult to [facility]; Future; Expected date: 02/07/2024    Bone marrow edema  -     Ambulatory referral/consult to [facility]; Future; Expected date: 02/07/2024    Chronic left-sided low back pain with bilateral sciatica  -     Ambulatory referral/consult to [facility]; Future; Expected date: 02/07/2024    General medical exam  -     CBC Auto Differential; Future; Expected date: 01/31/2024  -     Comprehensive Metabolic Panel; Future; Expected date: 01/31/2024    Encounter for lipid screening for  cardiovascular disease  -     Lipid Panel; Future; Expected date: 01/31/2024    Screening for diabetes mellitus  -     Hemoglobin A1C; Future; Expected date: 01/31/2024  -     TSH; Future; Expected date: 01/31/2024  -     T3, Free; Future; Expected date: 01/31/2024  -     T4, Free; Future; Expected date: 01/31/2024    Screening for HIV (human immunodeficiency virus)  -     HIV 1/2 Ag/Ab (4th Gen); Future; Expected date: 01/31/2024    Encounter for hepatitis C screening test for low risk patient  -     Hepatitis C Antibody; Future; Expected date: 01/31/2024    Well woman exam  -     Ambulatory referral/consult to Gynecology; Future; Expected date: 02/07/2024             [facility]   [address] [place] [address] [address]  [place] [address]  Office [phone]  Fax [phone]      Statement Selected

## 2024-06-20 ENCOUNTER — HOSPITAL ENCOUNTER (EMERGENCY)
Facility: HOSPITAL | Age: 54
Discharge: HOME | End: 2024-06-20
Attending: EMERGENCY MEDICINE
Payer: MEDICAID

## 2024-06-20 VITALS
OXYGEN SATURATION: 99 % | TEMPERATURE: 98 F | DIASTOLIC BLOOD PRESSURE: 58 MMHG | RESPIRATION RATE: 18 BRPM | WEIGHT: 232.38 LBS | SYSTOLIC BLOOD PRESSURE: 105 MMHG | BODY MASS INDEX: 33.34 KG/M2 | HEART RATE: 83 BPM

## 2024-06-20 DIAGNOSIS — S49.91XA INJURY OF RIGHT SHOULDER, INITIAL ENCOUNTER: Primary | ICD-10-CM
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
## 2024-06-20 PROCEDURE — 99283 EMERGENCY DEPT VISIT LOW MDM: CPT | Mod: 25

## 2024-06-20 NOTE — Clinical Note
"Sonali Awancathleen Harrison was seen and treated in our emergency department on 6/20/2024.  She may return to work on 06/21/2024.       If you have any questions or concerns, please don't hesitate to call.      Teressa Castano, DO"

## 2024-06-20 NOTE — ED PROVIDER NOTES
History      Chief Complaint   Patient presents with    Fall     Pt fell while walking down stairs. Pt states she missed a step and hit the wall. +right shoulder pain  -hitting head.        Review of patient's allergies indicates:   Allergen Reactions    Macrobid [nitrofurantoin monohyd/m-cryst] Hives    Nitrofurantoin macrocrystalline Hives and Itching        HPI   HPI    2024, 10:34 AM   History obtained from the patient      History of Present Illness: Sonali Harrison is a 54 y.o. female patient who presents to the Emergency Department for right shoulder pain since stumbling upstairs and catching herself.  Denies injury elsewhere.    No further complaints or concerns at this time.           PCP: Ev Rivera MD       Past Medical History:  Past Medical History:   Diagnosis Date    Arthritis     Depression     Diabetes mellitus     GERD (gastroesophageal reflux disease)     Hypertension     Insomnia     Panic attack          Past Surgical History:  Past Surgical History:   Procedure Laterality Date     SECTION      HYSTERECTOMY             Family History:  Family History   Problem Relation Name Age of Onset    Birth defects Neg Hx             Social History:  Social History     Tobacco Use    Smoking status: Every Day     Current packs/day: 0.25     Types: Cigarettes    Smokeless tobacco: Never   Substance and Sexual Activity    Alcohol use: No    Drug use: No    Sexual activity: Not Currently       ROS     Review of Systems   Constitutional:  Negative for fever.   Musculoskeletal:  Positive for arthralgias. Negative for back pain and neck pain.   Neurological:  Negative for syncope.       Physical Exam      Initial Vitals [24 0923]   BP Pulse Resp Temp SpO2   113/89 92 16 97.9 °F (36.6 °C) 97 %      MAP       --         Physical Exam  Vital signs and nursing notes reviewed.  Constitutional: Patient is in NAD. Awake and alert. Well-developed and well-nourished.  Head:  Atraumatic. Normocephalic.  Eyes:  EOM intact. Conjunctivae nl. No scleral icterus.  ENT: Mucous membranes are moist.   Neck: Supple.  No meningismus  Cardiovascular: Regular rate and rhythm. No murmurs, rubs, or gallops.   Pulmonary/Chest: No respiratory distress. Clear to auscultation bilaterally. No wheezing, rales, or rhonchi.  Abdominal: Soft. Non-distended. No TTP. No rebound, guarding, or rigidity. Good bowel sounds.  Genitourinary: No CVA tenderness  Musculoskeletal: Moves all extremities. No edema.  Right shoulder full range of motion.  Anterior tenderness.  2+ radial pulse  Skin: Warm and dry.  Neurological: Awake and alert. No acute focal neurological deficits are appreciated.  Psychiatric: Normal affect. Good eye contact. Appropriate in content.      ED Course          Procedures  ED Vital Signs:  Vitals:    06/20/24 0920 06/20/24 0923 06/20/24 1000   BP:  113/89 (!) 105/58   Pulse:  92 83   Resp:  16 18   Temp:  97.9 °F (36.6 °C) 98 °F (36.7 °C)   TempSrc:  Oral Oral   SpO2:  97% 99%   Weight: 105.4 kg (232 lb 5.8 oz)                   Imaging Results:  Imaging Results              X-Ray Shoulder Trauma Right (Final result)  Result time 06/20/24 10:05:24      Final result by Donovan Jacobson MD (06/20/24 10:05:24)                   Impression:      Degenerative change without definite acute abnormality.      Electronically signed by: Donovan Jacobson  Date:    06/20/2024  Time:    10:05               Narrative:    EXAMINATION:  XR SHOULDER TRAUMA 3 VIEW RIGHT    CLINICAL HISTORY:  Fall on same level from slipping, tripping and stumbling without subsequent striking against object, initial encounter    TECHNIQUE:  Three or four views of the right shoulder were performed.    COMPARISON:  None    FINDINGS:  No acute displaced fracture.  No traumatic malalignment.  No osseous destructive process.  Mild degenerative changes not unexpected for age.                                         The Emergency Provider  reviewed the vital signs and test results, which are outlined above.    ED Discussion             Medication(s) given in the ER:  Medications - No data to display         Follow-up Information       Ev Rivera MD In 2 days.    Specialty: Family Medicine  Contact information:  [address]  [address]  [facility]  [place] [address]  [phone]                                    Medication List        ASK your doctor about these medications      albuterol 90 mcg/actuation inhaler  Commonly known as: PROVENTIL/VENTOLIN HFA     ALPRAZolam 2 MG Tab  Commonly known as: XANAX     amitriptyline 100 MG tablet  Commonly known as: ELAVIL  Take 1 tablet (100 mg total) by mouth every evening.     atorvastatin 40 MG tablet  Commonly known as: LIPITOR  Take 1 tablet (40 mg total) by mouth once daily.     buPROPion 150 MG TB24 tablet  Commonly known as: WELLBUTRIN XL     butalbital-acetaminophen-caffeine -40 mg -40 mg per tablet  Commonly known as: FIORICET, ESGIC  Take 1 tablet by mouth every 6 (six) hours as needed for Pain.     clindamycin 300 MG capsule  Commonly known as: CLEOCIN     clonazePAM 1 MG tablet  Commonly known as: KlonoPIN  Take 1 tablet (1 mg total) by mouth 2 (two) times daily as needed for Anxiety.     * cyclobenzaprine 10 MG tablet  Commonly known as: FLEXERIL  Take 1 tablet (10 mg total) by mouth nightly as needed.     * cyclobenzaprine 10 MG tablet  Commonly known as: FLEXERIL  Take 1 tablet (10 mg total) by mouth 3 (three) times daily as needed for Muscle spasms.     cyproheptadine 4 mg tablet  Commonly known as: PERIACTIN     diazePAM 10 MG Tab  Commonly known as: VALIUM     famotidine 40 MG tablet  Commonly known as: PEPCID     FLUoxetine 10 MG Tab     fluticasone propionate 50 mcg/actuation nasal spray  Commonly known as: FLONASE  1 spray by Each Nare route 2 (two) times daily as needed.     furosemide 20 MG tablet  Commonly known as: LASIX  Take 1 tablet (20 mg total) by  mouth daily as needed (leg swelling).     glimepiride 2 MG tablet  Commonly known as: AMARYL     glipiZIDE 10 MG tablet  Commonly known as: GLUCOTROL  Take 1 tablet (10 mg total) by mouth 2 (two) times daily before meals.     HYDROcodone-acetaminophen 7.5-325 mg per tablet  Commonly known as: NORCO     ibuprofen 600 MG tablet  Commonly known as: ADVIL,MOTRIN     JARDIANCE 25 mg tablet  Generic drug: empagliflozin     LEVEMIR FLEXPEN 100 unit/mL (3 mL) Inpn pen  Generic drug: insulin detemir U-100 (Levemir)     losartan 50 MG tablet  Commonly known as: COZAAR     meclizine 25 mg tablet  Commonly known as: ANTIVERT  Take 1 tablet (25 mg total) by mouth every 6 (six) hours as needed for Dizziness or Nausea.     metFORMIN 500 MG tablet  Commonly known as: GLUCOPHAGE  Take 1 tablet (500 mg total) by mouth daily with breakfast.     metoprolol succinate 25 MG 24 hr tablet  Commonly known as: TOPROL-XL  Take 1 tablet (25 mg total) by mouth once daily.     * omeprazole 20 MG capsule  Commonly known as: PRILOSEC  Take 1 capsule (20 mg total) by mouth once daily.     * omeprazole 40 MG capsule  Commonly known as: PRILOSEC  Take 1 capsule (40 mg total) by mouth once daily.     orphenadrine 100 mg tablet  Commonly known as: NORFLEX  Take 1 tablet (100 mg total) by mouth 2 (two) times daily.     pioglitazone 15 MG tablet  Commonly known as: ACTOS  Take 1 tablet (15 mg total) by mouth once daily.     promethazine-dextromethorphan 6.25-15 mg/5 mL Syrp  Commonly known as: PROMETHAZINE-DM  1 tsp PO every 6 hours prn cough     QUEtiapine 300 MG Tab  Commonly known as: SEROQUEL  Take 2 tablets (600 mg total) by mouth once daily.     traMADoL 50 mg tablet  Commonly known as: ULTRAM  Take 1 tablet (50 mg total) by mouth every 6 (six) hours as needed for Pain.     TRULICITY 0.75 mg/0.5 mL pen injector  Generic drug: dulaglutide           * This list has 4 medication(s) that are the same as other medications prescribed for you. Read the  directions carefully, and ask your doctor or other care provider to review them with you.                      Medical Decision Making      Recommended over-the-counter pain medication    All findings were reviewed with the patient/family in detail.   All remaining questions and concerns were addressed at that time.  Patient/family has been counseled regarding the need for follow-up as well as the indication to return to the emergency room should new or worrisome developments occur.        MDM                 Clinical Impression:        ICD-10-CM ICD-9-CM   1. Injury of right shoulder, initial encounter  S49.91XA 959.2   2. Fall from slip, trip, or stumble, initial encounter  W01.0XXA E885.9               Luisana Almeida, PAROSE  06/20/24 1036

## 2024-07-09 ENCOUNTER — HOSPITAL ENCOUNTER (EMERGENCY)
Facility: HOSPITAL | Age: 54
Discharge: HOME | End: 2024-07-09
Attending: EMERGENCY MEDICINE
Payer: MEDICAID

## 2024-07-09 VITALS
WEIGHT: 228.38 LBS | RESPIRATION RATE: 18 BRPM | OXYGEN SATURATION: 99 % | HEART RATE: 91 BPM | BODY MASS INDEX: 32.77 KG/M2 | DIASTOLIC BLOOD PRESSURE: 65 MMHG | SYSTOLIC BLOOD PRESSURE: 103 MMHG | TEMPERATURE: 99 F

## 2024-07-09 DIAGNOSIS — R42 DIZZINESS: ICD-10-CM

## 2024-07-09 DIAGNOSIS — R45.89 ANXIETY ABOUT HEALTH: Primary | ICD-10-CM

## 2024-07-09 DIAGNOSIS — G89.29 CHRONIC BILATERAL LOW BACK PAIN WITHOUT SCIATICA: ICD-10-CM

## 2024-07-09 DIAGNOSIS — M54.50 CHRONIC BILATERAL LOW BACK PAIN WITHOUT SCIATICA: ICD-10-CM

## 2024-07-09 DIAGNOSIS — Z86.39 HISTORY OF DIABETES MELLITUS, TYPE II: ICD-10-CM

## 2024-07-09 LAB
ALBUMIN SERPL BCP-MCNC: 3.7 G/DL (ref 3.5–5.2)
ALP SERPL-CCNC: 96 U/L (ref 55–135)
ALT SERPL W/O P-5'-P-CCNC: 52 U/L (ref 10–44)
AMPHET+METHAMPHET UR QL: NEGATIVE
ANION GAP SERPL CALC-SCNC: 12 MMOL/L (ref 8–16)
AST SERPL-CCNC: 48 U/L (ref 10–40)
BACTERIA #/AREA URNS HPF: NORMAL /HPF
BARBITURATES UR QL SCN>200 NG/ML: NEGATIVE
BASOPHILS # BLD AUTO: 0.05 K/UL (ref 0–0.2)
BASOPHILS NFR BLD: 0.6 % (ref 0–1.9)
BENZODIAZ UR QL SCN>200 NG/ML: NEGATIVE
BILIRUB SERPL-MCNC: 0.3 MG/DL (ref 0.1–1)
BILIRUB UR QL STRIP: NEGATIVE
BUN SERPL-MCNC: 16 MG/DL (ref 6–20)
BZE UR QL SCN: NEGATIVE
CALCIUM SERPL-MCNC: 9.2 MG/DL (ref 8.7–10.5)
CANNABINOIDS UR QL SCN: NEGATIVE
CHLORIDE SERPL-SCNC: 99 MMOL/L (ref 95–110)
CLARITY UR: CLEAR
CO2 SERPL-SCNC: 24 MMOL/L (ref 23–29)
COLOR UR: COLORLESS
CREAT SERPL-MCNC: 0.9 MG/DL (ref 0.5–1.4)
CREAT UR-MCNC: 46.2 MG/DL (ref 15–325)
DIFFERENTIAL METHOD BLD: ABNORMAL
EOSINOPHIL # BLD AUTO: 0.1 K/UL (ref 0–0.5)
EOSINOPHIL NFR BLD: 0.6 % (ref 0–8)
ERYTHROCYTE [DISTWIDTH] IN BLOOD BY AUTOMATED COUNT: 15.8 % (ref 11.5–14.5)
EST. GFR  (NO RACE VARIABLE): >60 ML/MIN/1.73 M^2
GLUCOSE SERPL-MCNC: 288 MG/DL (ref 70–110)
GLUCOSE UR QL STRIP: ABNORMAL
HCT VFR BLD AUTO: 42.8 % (ref 37–48.5)
HGB BLD-MCNC: 13.6 G/DL (ref 12–16)
HGB UR QL STRIP: NEGATIVE
IMM GRANULOCYTES # BLD AUTO: 0.03 K/UL (ref 0–0.04)
IMM GRANULOCYTES NFR BLD AUTO: 0.4 % (ref 0–0.5)
KETONES UR QL STRIP: NEGATIVE
LEUKOCYTE ESTERASE UR QL STRIP: NEGATIVE
LYMPHOCYTES # BLD AUTO: 3.5 K/UL (ref 1–4.8)
LYMPHOCYTES NFR BLD: 41.5 % (ref 18–48)
MCH RBC QN AUTO: 22.4 PG (ref 27–31)
MCHC RBC AUTO-ENTMCNC: 31.8 G/DL (ref 32–36)
MCV RBC AUTO: 71 FL (ref 82–98)
METHADONE UR QL SCN>300 NG/ML: NEGATIVE
MICROSCOPIC COMMENT: NORMAL
MONOCYTES # BLD AUTO: 0.5 K/UL (ref 0.3–1)
MONOCYTES NFR BLD: 5.7 % (ref 4–15)
NEUTROPHILS # BLD AUTO: 4.4 K/UL (ref 1.8–7.7)
NEUTROPHILS NFR BLD: 51.2 % (ref 38–73)
NITRITE UR QL STRIP: NEGATIVE
NRBC BLD-RTO: 0 /100 WBC
OHS QRS DURATION: 74 MS
OHS QTC CALCULATION: 461 MS
OPIATES UR QL SCN: NEGATIVE
PCP UR QL SCN>25 NG/ML: NEGATIVE
PH UR STRIP: 6 [PH] (ref 5–8)
PLATELET # BLD AUTO: 412 K/UL (ref 150–450)
PMV BLD AUTO: 9.5 FL (ref 9.2–12.9)
POCT GLUCOSE: 302 MG/DL (ref 70–110)
POTASSIUM SERPL-SCNC: 4.8 MMOL/L (ref 3.5–5.1)
PROT SERPL-MCNC: 8 G/DL (ref 6–8.4)
PROT UR QL STRIP: NEGATIVE
RBC # BLD AUTO: 6.07 M/UL (ref 4–5.4)
SODIUM SERPL-SCNC: 135 MMOL/L (ref 136–145)
SP GR UR STRIP: 1.02 (ref 1–1.03)
SQUAMOUS #/AREA URNS HPF: 1 /HPF
TOXICOLOGY INFORMATION: NORMAL
TROPONIN I SERPL DL<=0.01 NG/ML-MCNC: <0.006 NG/ML (ref 0–0.03)
URN SPEC COLLECT METH UR: ABNORMAL
UROBILINOGEN UR STRIP-ACNC: NEGATIVE EU/DL
WBC # BLD AUTO: 8.54 K/UL (ref 3.9–12.7)
YEAST URNS QL MICRO: NORMAL

## 2024-07-09 PROCEDURE — 99284 EMERGENCY DEPT VISIT MOD MDM: CPT | Mod: 25

## 2024-07-09 PROCEDURE — 81000 URINALYSIS NONAUTO W/SCOPE: CPT | Mod: 59 | Performed by: PHYSICIAN ASSISTANT

## 2024-07-09 PROCEDURE — 80307 DRUG TEST PRSMV CHEM ANLYZR: CPT | Performed by: PHYSICIAN ASSISTANT

## 2024-07-09 PROCEDURE — 85025 COMPLETE CBC W/AUTO DIFF WBC: CPT | Performed by: PHYSICIAN ASSISTANT

## 2024-07-09 PROCEDURE — 84484 ASSAY OF TROPONIN QUANT: CPT | Performed by: PHYSICIAN ASSISTANT

## 2024-07-09 PROCEDURE — 82962 GLUCOSE BLOOD TEST: CPT

## 2024-07-09 PROCEDURE — 80053 COMPREHEN METABOLIC PANEL: CPT | Performed by: PHYSICIAN ASSISTANT

## 2024-07-09 PROCEDURE — 93005 ELECTROCARDIOGRAM TRACING: CPT

## 2024-07-09 PROCEDURE — 93010 ELECTROCARDIOGRAM REPORT: CPT | Mod: ,,, | Performed by: INTERNAL MEDICINE

## 2024-07-09 NOTE — FIRST PROVIDER EVALUATION
"Medical screening examination initiated.  I have conducted a focused provider triage encounter, findings are as follows:    Brief history of present illness:  Slurred speech, dizziness for 2 weeks.  Had CT head at [facility] yesterday.  She says no labs were done yesterday.  "They only gave me Tylenol"     Vitals:    07/09/24 0844 07/09/24 0847   BP:  134/75   Pulse:  107   Resp:  16   Temp:  98.4 °F (36.9 °C)   TempSrc:  Oral   SpO2:  99%   Weight: 103.6 kg (228 lb 6.4 oz)        Pertinent physical exam:  NAD, alert    Brief workup plan:  labs    Preliminary workup initiated; this workup will be continued and followed by the physician or advanced practice provider that is assigned to the patient when roomed.  "

## 2024-07-09 NOTE — ED PROVIDER NOTES
SCRIBE #1 NOTE: I, Bull Luu, am scribing for, and in the presence of, Cheryle Chery MD. I have scribed the entire note.       History     Chief Complaint   Patient presents with    General Illness     Slurred speech. +weakness. +dizziness. +lower back pain. X 2 weeks. seen at [facility] yesterday     Review of patient's allergies indicates:   Allergen Reactions    Macrobid [nitrofurantoin monohyd/m-cryst] Hives    Nitrofurantoin macrocrystalline Hives and Itching         History of Present Illness     HPI    2024, 10:14 AM  History obtained from the patient      History of Present Illness: Sonali Harrison is a 54 y.o. female patient with a PMHx of arthritis, depression, DM, GERD, HTN, insomnia, and panic attacks who presents to the Emergency Department for evaluation of lower back pain which is a chronic issue. She also notes having some slurred speech. She was seen at [facility] yesterday and notes receiving a CT scan head, that was negative. Symptoms are constant and moderate in severity. No mitigating or exacerbating factors reported. No associated sxs. Patient denies any CP, SOB, N/V/D, HA, fever, and all other sxs at this time. No prior Tx. She took her DM medication earlier today. She has failed to see pain management. No further complaints or concerns at this time.       Arrival mode: Personal vehicle      PCP: No, Primary Doctor        Past Medical History:  Past Medical History:   Diagnosis Date    Arthritis     Depression     Diabetes mellitus     GERD (gastroesophageal reflux disease)     Hypertension     Insomnia     Panic attack        Past Surgical History:  Past Surgical History:   Procedure Laterality Date     SECTION      HYSTERECTOMY           Family History:  Family History   Problem Relation Name Age of Onset    Birth defects Neg Hx         Social History:  Social History     Tobacco Use    Smoking status: Every Day     Current packs/day: 0.25     Types: Cigarettes     Smokeless tobacco: Never   Substance and Sexual Activity    Alcohol use: No    Drug use: No    Sexual activity: Not Currently        Review of Systems     Review of Systems   Constitutional:  Negative for fever.   HENT:  Negative for sore throat.    Respiratory:  Negative for shortness of breath.    Cardiovascular:  Negative for chest pain.   Gastrointestinal:  Negative for diarrhea, nausea and vomiting.   Genitourinary:  Negative for dysuria.   Musculoskeletal:  Positive for back pain (lower).   Skin:  Negative for rash.   Neurological:  Positive for speech difficulty (slurred). Negative for weakness and headaches.   Hematological:  Does not bruise/bleed easily.   All other systems reviewed and are negative.       Physical Exam     Initial Vitals [07/09/24 0847]   BP Pulse Resp Temp SpO2   134/75 107 16 98.4 °F (36.9 °C) 99 %      MAP       --          Physical Exam  Nursing Notes and Vital Signs Reviewed.  Constitutional: Patient is in no acute distress. Well-developed and well-nourished.  Head: Atraumatic. Normocephalic.  Eyes: PERRL. EOM intact. Conjunctivae are not pale. No scleral icterus.  ENT: Mucous membranes are moist. Oropharynx is clear and symmetric.    Neck: Supple. Full ROM. No lymphadenopathy.  Cardiovascular: Regular rate. Regular rhythm. No murmurs, rubs, or gallops. Distal pulses are 2+ and symmetric.  Pulmonary/Chest: No respiratory distress. Clear to auscultation bilaterally. No wheezing or rales.  Abdominal: Soft and non-distended.  There is no tenderness.  No rebound, guarding, or rigidity. Good bowel sounds.  Genitourinary: No CVA tenderness  Musculoskeletal: Moves all extremities. Able to ambulate without difficulty. No obvious deformities. No edema. No calf tenderness.  Skin: Warm and dry.  Neurological:  Alert, awake, and appropriate.  Normal speech. No evidence of slurred speech.   No acute focal neurological deficits are appreciated.  Psychiatric: Normal affect. Good eye contact.  Appropriate in content.     ED Course   Procedures  ED Vital Signs:  Vitals:    07/09/24 0844 07/09/24 0847 07/09/24 0944 07/09/24 0948   BP:  134/75 119/75    Pulse:  107 97 93   Resp:  16     Temp:  98.4 °F (36.9 °C)     TempSrc:  Oral     SpO2:  99% 100%    Weight: 103.6 kg (228 lb 6.4 oz)       07/09/24 1004 07/09/24 1034   BP: 128/78 103/65   Pulse: 93 91   Resp:  18   Temp:  98.6 °F (37 °C)   TempSrc:  Oral   SpO2: 99% 99%   Weight:         Abnormal Lab Results:  Labs Reviewed   CBC W/ AUTO DIFFERENTIAL - Abnormal; Notable for the following components:       Result Value    RBC 6.07 (*)     MCV 71 (*)     MCH 22.4 (*)     MCHC 31.8 (*)     RDW 15.8 (*)     All other components within normal limits   COMPREHENSIVE METABOLIC PANEL - Abnormal; Notable for the following components:    Sodium 135 (*)     Glucose 288 (*)     AST 48 (*)     ALT 52 (*)     All other components within normal limits   URINALYSIS, REFLEX TO URINE CULTURE - Abnormal; Notable for the following components:    Color, UA Colorless (*)     Glucose, UA 4+ (*)     All other components within normal limits    Narrative:     Specimen Source->Urine   POCT GLUCOSE - Abnormal; Notable for the following components:    POCT Glucose 302 (*)     All other components within normal limits   TROPONIN I   DRUG SCREEN PANEL, URINE EMERGENCY    Narrative:     Specimen Source->Urine   URINALYSIS MICROSCOPIC    Narrative:     Specimen Source->Urine        All Lab Results:  Results for orders placed or performed during the hospital encounter of 07/09/24   CBC auto differential   Result Value Ref Range    WBC 8.54 3.90 - 12.70 K/uL    RBC 6.07 (H) 4.00 - 5.40 M/uL    Hemoglobin 13.6 12.0 - 16.0 g/dL    Hematocrit 42.8 37.0 - 48.5 %    MCV 71 (L) 82 - 98 fL    MCH 22.4 (L) 27.0 - 31.0 pg    MCHC 31.8 (L) 32.0 - 36.0 g/dL    RDW 15.8 (H) 11.5 - 14.5 %    Platelets 412 150 - 450 K/uL    MPV 9.5 9.2 - 12.9 fL    Immature Granulocytes 0.4 0.0 - 0.5 %    Gran # (ANC)  4.4 1.8 - 7.7 K/uL    Immature Grans (Abs) 0.03 0.00 - 0.04 K/uL    Lymph # 3.5 1.0 - 4.8 K/uL    Mono # 0.5 0.3 - 1.0 K/uL    Eos # 0.1 0.0 - 0.5 K/uL    Baso # 0.05 0.00 - 0.20 K/uL    nRBC 0 0 /100 WBC    Gran % 51.2 38.0 - 73.0 %    Lymph % 41.5 18.0 - 48.0 %    Mono % 5.7 4.0 - 15.0 %    Eosinophil % 0.6 0.0 - 8.0 %    Basophil % 0.6 0.0 - 1.9 %    Differential Method Automated    Comprehensive metabolic panel   Result Value Ref Range    Sodium 135 (L) 136 - 145 mmol/L    Potassium 4.8 3.5 - 5.1 mmol/L    Chloride 99 95 - 110 mmol/L    CO2 24 23 - 29 mmol/L    Glucose 288 (H) 70 - 110 mg/dL    BUN 16 6 - 20 mg/dL    Creatinine 0.9 0.5 - 1.4 mg/dL    Calcium 9.2 8.7 - 10.5 mg/dL    Total Protein 8.0 6.0 - 8.4 g/dL    Albumin 3.7 3.5 - 5.2 g/dL    Total Bilirubin 0.3 0.1 - 1.0 mg/dL    Alkaline Phosphatase 96 55 - 135 U/L    AST 48 (H) 10 - 40 U/L    ALT 52 (H) 10 - 44 U/L    eGFR >60 >60 mL/min/1.73 m^2    Anion Gap 12 8 - 16 mmol/L   Troponin I #1   Result Value Ref Range    Troponin I <0.006 0.000 - 0.026 ng/mL   Drug screen panel, emergency   Result Value Ref Range    Benzodiazepines Negative Negative    Methadone metabolites Negative Negative    Cocaine (Metab.) Negative Negative    Opiate Scrn, Ur Negative Negative    Barbiturate Screen, Ur Negative Negative    Amphetamine Screen, Ur Negative Negative    THC Negative Negative    Phencyclidine Negative Negative    Creatinine, Urine 46.2 15.0 - 325.0 mg/dL    Toxicology Information SEE COMMENT    Urinalysis, Reflex to Urine Culture Urine, Clean Catch    Specimen: Urine, Clean Catch   Result Value Ref Range    Specimen UA Urine, Clean Catch     Color, UA Colorless (A) Yellow, Straw, Sophia    Appearance, UA Clear Clear    pH, UA 6.0 5.0 - 8.0    Specific Gravity, UA 1.025 1.005 - 1.030    Protein, UA Negative Negative    Glucose, UA 4+ (A) Negative    Ketones, UA Negative Negative    Bilirubin (UA) Negative Negative    Occult Blood UA Negative Negative     Nitrite, UA Negative Negative    Urobilinogen, UA Negative <2.0 EU/dL    Leukocytes, UA Negative Negative   Urinalysis Microscopic   Result Value Ref Range    Bacteria Rare None-Occ /hpf    Yeast, UA None None    Squam Epithel, UA 1 /hpf    Microscopic Comment SEE COMMENT    EKG 12-lead   Result Value Ref Range    QRS Duration 74 ms    [facility] QTC Calculation 461 ms   POCT glucose   Result Value Ref Range    POCT Glucose 302 (H) 70 - 110 mg/dL        Imaging Results:  Imaging Results    None          The EKG was ordered, reviewed, and independently interpreted by the ED provider.  Interpretation time: 8:57  Rate: 100 BPM  Rhythm: Normal sinus rhythm   Interpretation: Possible Left atrial enlargement. No STEMI.             The Emergency Provider reviewed the vital signs and test results, which are outlined above.     ED Discussion       10:23 AM: Reassessed pt at this time. Discussed with pt all pertinent ED information and results. Discussed pt dx and plan of tx. Gave pt all f/u and return to the ED instructions. All questions and concerns were addressed at this time. Pt expresses understanding of information and instructions, and is comfortable with plan to discharge. Pt is stable for discharge.    I discussed with patient and/or family/caretaker that evaluation in the ED does not suggest any emergent or life threatening medical conditions requiring immediate intervention beyond what was provided in the ED, and I believe patient is safe for discharge.  Regardless, an unremarkable evaluation in the ED does not preclude the development or presence of a serious of life threatening condition. As such, patient was instructed to return immediately for any worsening or change in current symptoms.         Medical Decision Making  DDX: 1. Uncontrolled BS 2. Anxiety 3. Infection 4. Sciatica    ECG no acute ischemic changes, wbc normal, cmp normal except glucose mildly elevated, UA otherwise negative, troponin negative, normal  gait and motor observed, negative straight leg test, no indication for further workup, stable for discharge.    Amount and/or Complexity of Data Reviewed  Labs: ordered. Decision-making details documented in ED Course.  ECG/medicine tests: ordered and independent interpretation performed. Decision-making details documented in ED Course.    Risk  Prescription drug management.  Decision regarding hospitalization.                ED Medication(s):  Medications - No data to display    Discharge Medication List as of 7/9/2024 10:20 AM           Follow-up Information       Tasneem Dooley, NP. Schedule an appointment as soon as possible for a visit in 2 days.    Specialty: Urgent Care  Why: Return to the Emergency Room  Contact information:  [id number] [place] [id number]  [phone]                                 Scribe Attestation:   Scribe #1: I performed the above scribed service and the documentation accurately describes the services I performed. I attest to the accuracy of the note.     Attending:   Physician Attestation Statement for Scribe #1: I, Cheryle Chery MD, personally performed the services described in this documentation, as scribed by Bull Luu, in my presence, and it is both accurate and complete.           Clinical Impression       ICD-10-CM ICD-9-CM   1. Anxiety about health  R45.89 799.29   2. Dizziness  R42 780.4   3. Chronic bilateral low back pain without sciatica  M54.50 724.2    G89.29 338.29   4. History of diabetes mellitus, type II  Z86.39 V12.29       Disposition:   Disposition: Discharged  Condition: Stable        Cheryle Chery MD  07/11/24 0820

## 2024-11-22 ENCOUNTER — HOSPITAL ENCOUNTER (EMERGENCY)
Facility: HOSPITAL | Age: 54
Discharge: HOME | End: 2024-11-22
Attending: EMERGENCY MEDICINE
Payer: MEDICAID

## 2024-11-22 VITALS
BODY MASS INDEX: 31.64 KG/M2 | HEART RATE: 80 BPM | WEIGHT: 221 LBS | RESPIRATION RATE: 20 BRPM | OXYGEN SATURATION: 99 % | SYSTOLIC BLOOD PRESSURE: 131 MMHG | TEMPERATURE: 99 F | HEIGHT: 70 IN | DIASTOLIC BLOOD PRESSURE: 80 MMHG

## 2024-11-22 DIAGNOSIS — N30.01 ACUTE CYSTITIS WITH HEMATURIA: ICD-10-CM

## 2024-11-22 DIAGNOSIS — R10.9 FLANK PAIN: Primary | ICD-10-CM

## 2024-11-22 LAB
ALBUMIN SERPL BCP-MCNC: 3.2 G/DL (ref 3.5–5.2)
ALP SERPL-CCNC: 92 U/L (ref 40–150)
ALT SERPL W/O P-5'-P-CCNC: 39 U/L (ref 10–44)
ANION GAP SERPL CALC-SCNC: 8 MMOL/L (ref 8–16)
AST SERPL-CCNC: 51 U/L (ref 10–40)
BACTERIA #/AREA URNS HPF: ABNORMAL /HPF
BASOPHILS # BLD AUTO: 0.04 K/UL (ref 0–0.2)
BASOPHILS NFR BLD: 0.6 % (ref 0–1.9)
BILIRUB SERPL-MCNC: 0.3 MG/DL (ref 0.1–1)
BILIRUB UR QL STRIP: NEGATIVE
BUN SERPL-MCNC: 11 MG/DL (ref 6–20)
CALCIUM SERPL-MCNC: 8.7 MG/DL (ref 8.7–10.5)
CHLORIDE SERPL-SCNC: 99 MMOL/L (ref 95–110)
CLARITY UR: CLEAR
CO2 SERPL-SCNC: 25 MMOL/L (ref 23–29)
COLOR UR: YELLOW
CREAT SERPL-MCNC: 0.9 MG/DL (ref 0.5–1.4)
DIFFERENTIAL METHOD BLD: ABNORMAL
EOSINOPHIL # BLD AUTO: 0 K/UL (ref 0–0.5)
EOSINOPHIL NFR BLD: 0.6 % (ref 0–8)
ERYTHROCYTE [DISTWIDTH] IN BLOOD BY AUTOMATED COUNT: 15.3 % (ref 11.5–14.5)
EST. GFR  (NO RACE VARIABLE): >60 ML/MIN/1.73 M^2
GLUCOSE SERPL-MCNC: 433 MG/DL (ref 70–110)
GLUCOSE UR QL STRIP: ABNORMAL
HCT VFR BLD AUTO: 40.7 % (ref 37–48.5)
HGB BLD-MCNC: 12.7 G/DL (ref 12–16)
HGB UR QL STRIP: NEGATIVE
IMM GRANULOCYTES # BLD AUTO: 0.03 K/UL (ref 0–0.04)
IMM GRANULOCYTES NFR BLD AUTO: 0.4 % (ref 0–0.5)
KETONES UR QL STRIP: NEGATIVE
LEUKOCYTE ESTERASE UR QL STRIP: NEGATIVE
LYMPHOCYTES # BLD AUTO: 2.6 K/UL (ref 1–4.8)
LYMPHOCYTES NFR BLD: 36.3 % (ref 18–48)
MCH RBC QN AUTO: 21.9 PG (ref 27–31)
MCHC RBC AUTO-ENTMCNC: 31.2 G/DL (ref 32–36)
MCV RBC AUTO: 70 FL (ref 82–98)
MICROSCOPIC COMMENT: ABNORMAL
MONOCYTES # BLD AUTO: 0.4 K/UL (ref 0.3–1)
MONOCYTES NFR BLD: 5.7 % (ref 4–15)
NEUTROPHILS # BLD AUTO: 4.1 K/UL (ref 1.8–7.7)
NEUTROPHILS NFR BLD: 56.4 % (ref 38–73)
NITRITE UR QL STRIP: NEGATIVE
NRBC BLD-RTO: 0 /100 WBC
PH UR STRIP: 7 [PH] (ref 5–8)
PLATELET # BLD AUTO: 355 K/UL (ref 150–450)
PMV BLD AUTO: 9.7 FL (ref 9.2–12.9)
POCT GLUCOSE: 288 MG/DL (ref 70–110)
POCT GLUCOSE: 317 MG/DL (ref 70–110)
POTASSIUM SERPL-SCNC: 5.1 MMOL/L (ref 3.5–5.1)
PROT SERPL-MCNC: 6.5 G/DL (ref 6–8.4)
PROT UR QL STRIP: NEGATIVE
RBC # BLD AUTO: 5.79 M/UL (ref 4–5.4)
RBC #/AREA URNS HPF: 0 /HPF (ref 0–4)
SODIUM SERPL-SCNC: 132 MMOL/L (ref 136–145)
SP GR UR STRIP: 1.03 (ref 1–1.03)
SQUAMOUS #/AREA URNS HPF: 1 /HPF
URN SPEC COLLECT METH UR: ABNORMAL
UROBILINOGEN UR STRIP-ACNC: NEGATIVE EU/DL
WBC # BLD AUTO: 7.24 K/UL (ref 3.9–12.7)
WBC #/AREA URNS HPF: 1 /HPF (ref 0–5)
YEAST URNS QL MICRO: ABNORMAL

## 2024-11-22 PROCEDURE — 81000 URINALYSIS NONAUTO W/SCOPE: CPT | Performed by: EMERGENCY MEDICINE

## 2024-11-22 PROCEDURE — 82962 GLUCOSE BLOOD TEST: CPT

## 2024-11-22 PROCEDURE — 85025 COMPLETE CBC W/AUTO DIFF WBC: CPT | Performed by: EMERGENCY MEDICINE

## 2024-11-22 PROCEDURE — 63600175 PHARM REV CODE 636 W HCPCS: Performed by: EMERGENCY MEDICINE

## 2024-11-22 PROCEDURE — 96372 THER/PROPH/DIAG INJ SC/IM: CPT | Performed by: EMERGENCY MEDICINE

## 2024-11-22 PROCEDURE — 99285 EMERGENCY DEPT VISIT HI MDM: CPT | Mod: 25

## 2024-11-22 PROCEDURE — 80053 COMPREHEN METABOLIC PANEL: CPT | Performed by: EMERGENCY MEDICINE

## 2024-11-22 RX ORDER — HYDROCODONE BITARTRATE AND ACETAMINOPHEN 5; 325 MG/1; MG/1
1 TABLET ORAL EVERY 4 HOURS PRN
Qty: 11 TABLET | Refills: 0 | Status: SHIPPED | OUTPATIENT
Start: 2024-11-22 | End: 2024-11-27

## 2024-11-22 RX ORDER — CEFUROXIME AXETIL 500 MG/1
500 TABLET ORAL 2 TIMES DAILY
Qty: 20 TABLET | Refills: 0 | Status: SHIPPED | OUTPATIENT
Start: 2024-11-22 | End: 2024-12-02

## 2024-11-22 RX ADMIN — HUMAN INSULIN 10 UNITS: 100 INJECTION, SOLUTION SUBCUTANEOUS at 08:11

## 2024-11-22 NOTE — ED PROVIDER NOTES
SCRIBE #1 NOTE: I, Domingo Del Real, am scribing for, and in the presence of, Frank Contreras MD. I have scribed the entire note.       History     Chief Complaint   Patient presents with    Flank Pain     Bilateral flank pain radiating to right lower pelvis and right shoulder. Denies burning, urgency, frequency, malodorous urine. Denies hx of kidney stones. Denies vaginal discharge. Denies recent injury      Review of patient's allergies indicates:   Allergen Reactions    Macrobid [nitrofurantoin monohyd/m-cryst] Hives    Nitrofurantoin macrocrystalline Hives and Itching         History of Present Illness     HPI    2024, 7:11 AM  History obtained from the patient      History of Present Illness: Sonali Harrison is a 54 y.o. female patient with a PMHx of GERD, depression, HTN, and DM who presents to the Emergency Department for evaluation of bilateral flank pain which onset gradually within the past couple of days. Pt mentions of right lower pelvic pain and right shoulder pain. Symptoms are constant and moderate in severity. No mitigating or exacerbating factors reported. Patient denies any urgency, frequency, malodorous urine, abnormal vaginal discharge, dizziness, N/V, fever, and all other sxs at this time. No prior Tx. No further complaints or concerns at this time.       Arrival mode: Personal vehicle    PCP: Kylee, Primary Doctor        Past Medical History:  Past Medical History:   Diagnosis Date    Arthritis     Depression     Diabetes mellitus     GERD (gastroesophageal reflux disease)     Hypertension     Insomnia     Panic attack        Past Surgical History:  Past Surgical History:   Procedure Laterality Date     SECTION      HYSTERECTOMY           Family History:  Family History   Problem Relation Name Age of Onset    Birth defects Neg Hx         Social History:  Social History     Tobacco Use    Smoking status: Every Day     Current packs/day: 0.25     Types: Cigarettes     Smokeless tobacco: Never   Substance and Sexual Activity    Alcohol use: No    Drug use: No    Sexual activity: Not Currently        Review of Systems     Review of Systems   Constitutional:  Negative for chills and fever.   HENT:  Negative for congestion.    Respiratory:  Negative for cough and shortness of breath.    Cardiovascular:  Negative for chest pain.   Gastrointestinal:  Negative for constipation, nausea and vomiting.   Genitourinary:  Positive for flank pain (bilateral) and pelvic pain (right). Negative for dysuria, frequency, urgency and vaginal discharge.   Musculoskeletal:  Positive for arthralgias (right shoulder). Negative for back pain.   Skin:  Negative for rash.   Neurological:  Negative for dizziness and weakness.   Hematological:  Does not bruise/bleed easily.   All other systems reviewed and are negative.       Physical Exam     Initial Vitals [11/22/24 0650]   BP Pulse Resp Temp SpO2   (!) 110/57 92 16 97.9 °F (36.6 °C) 99 %      MAP       --          Physical Exam  Nursing Notes and Vital Signs Reviewed.  Constitutional: Patient is in no acute distress. Well-developed and well-nourished.  Head: Atraumatic. Normocephalic.  Eyes: PERRL. EOM intact. Conjunctivae are not pale. No scleral icterus.  ENT: Mucous membranes are moist. Oropharynx is clear and symmetric.    Neck: Supple. Full ROM. No lymphadenopathy.  Cardiovascular: Regular rate. Regular rhythm. No murmurs, rubs, or gallops. Distal pulses are 2+ and symmetric.  Pulmonary/Chest: No respiratory distress. Clear to auscultation bilaterally. No wheezing or rales.  Abdominal: Soft and non-distended.  There is no tenderness.  No rebound, guarding, or rigidity. Good bowel sounds.  Genitourinary: No CVA tenderness  Musculoskeletal: Moves all extremities. No obvious deformities. No edema. No calf tenderness.  Skin: Warm and dry.  Neurological:  Alert, awake, and appropriate.  Normal speech.  No acute focal neurological deficits are  "appreciated.  Psychiatric: Normal affect. Good eye contact. Appropriate in content.     ED Course   Procedures  ED Vital Signs:  Vitals:    11/22/24 0650 11/22/24 0715 11/22/24 0745 11/22/24 0845   BP: (!) 110/57 (!) 102/57 124/74    Pulse: 92 83 82    Resp: 16 12 20 20   Temp: 97.9 °F (36.6 °C)      TempSrc: Oral      SpO2: 99% 100% 99%    Weight: 100.2 kg (221 lb)      Height: 5' 10" (1.778 m)       11/22/24 0900   BP: 131/80   Pulse: 80   Resp: 20   Temp: 98.6 °F (37 °C)   TempSrc:    SpO2: 99%   Weight:    Height:        Abnormal Lab Results:  Labs Reviewed   URINALYSIS, REFLEX TO URINE CULTURE - Abnormal       Result Value    Specimen UA Urine, Clean Catch      Color, UA Yellow      Appearance, UA Clear      pH, UA 7.0      Specific Gravity, UA 1.030      Protein, UA Negative      Glucose, UA 4+ (*)     Ketones, UA Negative      Bilirubin (UA) Negative      Occult Blood UA Negative      Nitrite, UA Negative      Urobilinogen, UA Negative      Leukocytes, UA Negative      Narrative:     Specimen Source->Urine   CBC W/ AUTO DIFFERENTIAL - Abnormal    WBC 7.24      RBC 5.79 (*)     Hemoglobin 12.7      Hematocrit 40.7      MCV 70 (*)     MCH 21.9 (*)     MCHC 31.2 (*)     RDW 15.3 (*)     Platelets 355      MPV 9.7      Immature Granulocytes 0.4      Gran # (ANC) 4.1      Immature Grans (Abs) 0.03      Lymph # 2.6      Mono # 0.4      Eos # 0.0      Baso # 0.04      nRBC 0      Gran % 56.4      Lymph % 36.3      Mono % 5.7      Eosinophil % 0.6      Basophil % 0.6      Differential Method Automated     COMPREHENSIVE METABOLIC PANEL - Abnormal    Sodium 132 (*)     Potassium 5.1      Chloride 99      CO2 25      Glucose 433 (*)     BUN 11      Creatinine 0.9      Calcium 8.7      Total Protein 6.5      Albumin 3.2 (*)     Total Bilirubin 0.3      Alkaline Phosphatase 92      AST 51 (*)     ALT 39      eGFR >60      Anion Gap 8     URINALYSIS MICROSCOPIC - Abnormal    RBC, UA 0      WBC, UA 1      Bacteria Few (*)  "    Yeast, UA Occasional (*)     Squam Epithel, UA 1      Microscopic Comment SEE COMMENT      Narrative:     Specimen Source->Urine   POCT GLUCOSE - Abnormal    POCT Glucose 317 (*)    POCT GLUCOSE - Abnormal    POCT Glucose 288 (*)    POCT GLUCOSE MONITORING CONTINUOUS        All Lab Results:  Results for orders placed or performed during the hospital encounter of 11/22/24   CBC Auto Differential    Collection Time: 11/22/24  7:12 AM   Result Value Ref Range    WBC 7.24 3.90 - 12.70 K/uL    RBC 5.79 (H) 4.00 - 5.40 M/uL    Hemoglobin 12.7 12.0 - 16.0 g/dL    Hematocrit 40.7 37.0 - 48.5 %    MCV 70 (L) 82 - 98 fL    MCH 21.9 (L) 27.0 - 31.0 pg    MCHC 31.2 (L) 32.0 - 36.0 g/dL    RDW 15.3 (H) 11.5 - 14.5 %    Platelets 355 150 - 450 K/uL    MPV 9.7 9.2 - 12.9 fL    Immature Granulocytes 0.4 0.0 - 0.5 %    Gran # (ANC) 4.1 1.8 - 7.7 K/uL    Immature Grans (Abs) 0.03 0.00 - 0.04 K/uL    Lymph # 2.6 1.0 - 4.8 K/uL    Mono # 0.4 0.3 - 1.0 K/uL    Eos # 0.0 0.0 - 0.5 K/uL    Baso # 0.04 0.00 - 0.20 K/uL    nRBC 0 0 /100 WBC    Gran % 56.4 38.0 - 73.0 %    Lymph % 36.3 18.0 - 48.0 %    Mono % 5.7 4.0 - 15.0 %    Eosinophil % 0.6 0.0 - 8.0 %    Basophil % 0.6 0.0 - 1.9 %    Differential Method Automated    Comprehensive Metabolic Panel    Collection Time: 11/22/24  7:12 AM   Result Value Ref Range    Sodium 132 (L) 136 - 145 mmol/L    Potassium 5.1 3.5 - 5.1 mmol/L    Chloride 99 95 - 110 mmol/L    CO2 25 23 - 29 mmol/L    Glucose 433 (H) 70 - 110 mg/dL    BUN 11 6 - 20 mg/dL    Creatinine 0.9 0.5 - 1.4 mg/dL    Calcium 8.7 8.7 - 10.5 mg/dL    Total Protein 6.5 6.0 - 8.4 g/dL    Albumin 3.2 (L) 3.5 - 5.2 g/dL    Total Bilirubin 0.3 0.1 - 1.0 mg/dL    Alkaline Phosphatase 92 40 - 150 U/L    AST 51 (H) 10 - 40 U/L    ALT 39 10 - 44 U/L    eGFR >60 >60 mL/min/1.73 m^2    Anion Gap 8 8 - 16 mmol/L   Urinalysis, Reflex to Urine Culture Urine, Clean Catch    Collection Time: 11/22/24  8:00 AM    Specimen: Urine   Result Value  Ref Range    Specimen UA Urine, Clean Catch     Color, UA Yellow Yellow, Straw, Sophia    Appearance, UA Clear Clear    pH, UA 7.0 5.0 - 8.0    Specific Gravity, UA 1.030 1.005 - 1.030    Protein, UA Negative Negative    Glucose, UA 4+ (A) Negative    Ketones, UA Negative Negative    Bilirubin (UA) Negative Negative    Occult Blood UA Negative Negative    Nitrite, UA Negative Negative    Urobilinogen, UA Negative <2.0 EU/dL    Leukocytes, UA Negative Negative   Urinalysis Microscopic    Collection Time: 11/22/24  8:00 AM   Result Value Ref Range    RBC, UA 0 0 - 4 /hpf    WBC, UA 1 0 - 5 /hpf    Bacteria Few (A) None-Occ /hpf    Yeast, UA Occasional (A) None    Squam Epithel, UA 1 /hpf    Microscopic Comment SEE COMMENT    POCT glucose    Collection Time: 11/22/24  8:11 AM   Result Value Ref Range    POCT Glucose 317 (H) 70 - 110 mg/dL   POCT glucose    Collection Time: 11/22/24  8:48 AM   Result Value Ref Range    POCT Glucose 288 (H) 70 - 110 mg/dL           Imaging Results:  Imaging Results              CT Renal Stone Study ABD Pelvis WO (Final result)  Result time 11/22/24 08:02:09      Final result by Broderick Nowak MD (11/22/24 08:02:09)                   Impression:      Motion limited study.    Moderate constipation.    Moderate bladder distension.    Evaluation of solid organ and vascular pathology is limited due to lack of IV contrast.    All CT scans at this facility use dose modulation, iterative reconstruction, and/or weight based dosing when appropriate to reduce radiation dose to as low as reasonable achievable.      Electronically signed by: Broderick Nowak MD  Date:    11/22/2024  Time:    08:02               Narrative:    EXAMINATION:  CT RENAL STONE STUDY ABD PELVIS WO    CLINICAL HISTORY:  Flank pain, kidney stone suspected;    TECHNIQUE:  Low dose axial images, sagittal and coronal reformations were obtained from the lung bases to the pubic symphysis.  Oral contrast was not  administered.    COMPARISON:  None    FINDINGS:  Motion limited study.    Heart: Normal size. No effusion.    Lung Bases: Clear.    Liver: Hepatomegaly with decreased attenuation consistent with diffuse hepatic steatosis..  No focal lesions.  Focal fatty sparing seen adjacent the gallbladder fossa.    Gallbladder: No calcified gallstones.    Bile Ducts: No dilatation.    Pancreas: No obvious mass. No peripancreatic fat stranding.    Spleen: Normal.    Adrenals: Normal.    Kidneys/Ureters: No mass, hydroureteronephrosis, or nephroureterolithiasis.    Bladder: Moderate bladder distension.  No wall thickening.    Reproductive organs: Normal.    GI Tract/Mesentery: No evidence of bowel obstruction or inflammation. Moderate constipation.  No evidence of appendicitis    Peritoneal Space: No ascites or free air.    Retroperitoneum: No significant adenopathy.    Abdominal wall: Small fat containing umbilical hernia    Vasculature: No aneurysm.    Bones: No acute fracture.  Mild degenerative change.  Postoperative changes right hip.  No suspicious lytic or sclerotic lesions.                                              The Emergency Provider reviewed the vital signs and test results, which are outlined above.     ED Discussion       8:53 AM: Reassessed pt at this time.  Discussed with pt all pertinent ED information and results. Discussed pt dx and plan of tx. Gave pt all f/u and return to the ED instructions. All questions and concerns were addressed at this time. Pt expresses understanding of information and instructions, and is comfortable with plan to discharge. Pt is stable for discharge.    I discussed with patient and/or family/caretaker that evaluation in the ED does not suggest any emergent or life threatening medical conditions requiring immediate intervention beyond what was provided in the ED, and I believe patient is safe for discharge.  Regardless, an unremarkable evaluation in the ED does not preclude the  development or presence of a serious of life threatening condition. As such, patient was instructed to return immediately for any worsening or change in current symptoms.         Medical Decision Making  DDx: flank pain, kidney stone    Amount and/or Complexity of Data Reviewed  Labs: ordered. Decision-making details documented in ED Course.  Radiology: ordered. Decision-making details documented in ED Course.    Risk  OTC drugs.  Prescription drug management.                ED Medication(s):  Medications   insulin regular injection 10 Units 0.1 mL (10 Units Subcutaneous Given 11/22/24 0813)       Discharge Medication List as of 11/22/2024  8:52 AM        START taking these medications    Details   cefUROXime (CEFTIN) 500 MG tablet Take 1 tablet (500 mg total) by mouth 2 (two) times daily. for 10 days, Starting Fri 11/22/2024, Until Mon 12/2/2024, Normal      HYDROcodone-acetaminophen (NORCO) 5-325 mg per tablet Take 1 tablet by mouth every 4 (four) hours as needed., Starting Fri 11/22/2024, Until Wed 11/27/2024 at 2359, Normal              Follow-up Information       [facility] In 2 days.    Contact information:  [address] [facility] [address] [address] [phone]                                 Scribe Attestation:   Scribe #1: I performed the above scribed service and the documentation accurately describes the services I performed. I attest to the accuracy of the note.     Attending:   Physician Attestation Statement for Scribe #1: I, Frank Contreras MD, personally performed the services described in this documentation, as scribed by Domingo Del Real, in my presence, and it is both accurate and complete.           Clinical Impression       ICD-10-CM ICD-9-CM   1. Flank pain  R10.9 789.09   2. Acute cystitis with hematuria  N30.01 595.0       Disposition:   Disposition: Discharged  Condition: Stable        Frank Contreras MD  11/22/24 0942

## 2025-01-03 ENCOUNTER — HOSPITAL ENCOUNTER (EMERGENCY)
Facility: HOSPITAL | Age: 55
Discharge: HOME | End: 2025-01-03
Attending: EMERGENCY MEDICINE
Payer: MEDICAID

## 2025-01-03 VITALS
OXYGEN SATURATION: 98 % | TEMPERATURE: 99 F | BODY MASS INDEX: 31.35 KG/M2 | WEIGHT: 219 LBS | HEIGHT: 70 IN | SYSTOLIC BLOOD PRESSURE: 123 MMHG | DIASTOLIC BLOOD PRESSURE: 76 MMHG | HEART RATE: 95 BPM | RESPIRATION RATE: 18 BRPM

## 2025-01-03 DIAGNOSIS — R51.9 NONINTRACTABLE HEADACHE, UNSPECIFIED CHRONICITY PATTERN, UNSPECIFIED HEADACHE TYPE: Primary | ICD-10-CM

## 2025-01-03 DIAGNOSIS — M25.511 RIGHT SHOULDER PAIN: ICD-10-CM

## 2025-01-03 PROCEDURE — 99284 EMERGENCY DEPT VISIT MOD MDM: CPT | Mod: 25

## 2025-01-03 PROCEDURE — 25000003 PHARM REV CODE 250: Performed by: NURSE PRACTITIONER

## 2025-01-03 RX ORDER — METOCLOPRAMIDE 5 MG/1
10 TABLET ORAL
Status: COMPLETED | OUTPATIENT
Start: 2025-01-03 | End: 2025-01-03

## 2025-01-03 RX ORDER — METOCLOPRAMIDE 10 MG/1
10 TABLET ORAL EVERY 6 HOURS PRN
Qty: 30 TABLET | Refills: 0 | Status: SHIPPED | OUTPATIENT
Start: 2025-01-03

## 2025-01-03 RX ADMIN — METOCLOPRAMIDE 10 MG: 5 TABLET ORAL at 09:01

## 2025-01-03 NOTE — ED PROVIDER NOTES
Encounter Date: 1/3/2025       History     Chief Complaint   Patient presents with    Headache     Pt c/o generalized HA x3 days unrelieved by OTC medications. Also c/o right shoulder pain after a trip and fall x4 days ago. Denies hitting head or LOC.     54-year-old female presents the emergency department for headache with intermittent blurred vision times several days.  Patient also reports acute right shoulder pain from fall.  patient denies any fever, chills, chest pain, shortness of breath, back pain, abdominal pain, nausea, vomiting, and all other concerns at this time.    The history is provided by the patient. No  was used.     Review of patient's allergies indicates:   Allergen Reactions    Macrobid [nitrofurantoin monohyd/m-cryst] Hives    Nitrofurantoin macrocrystalline Hives and Itching     Past Medical History:   Diagnosis Date    Arthritis     Depression     Diabetes mellitus     GERD (gastroesophageal reflux disease)     Hypertension     Insomnia     Panic attack      Past Surgical History:   Procedure Laterality Date     SECTION      HYSTERECTOMY       Family History   Problem Relation Name Age of Onset    Birth defects Neg Hx       Social History     Tobacco Use    Smoking status: Every Day     Current packs/day: 0.25     Types: Cigarettes    Smokeless tobacco: Never   Substance Use Topics    Alcohol use: No    Drug use: No     Review of Systems   Constitutional:  Negative for fever.   HENT:  Negative for sore throat.    Respiratory:  Negative for shortness of breath.    Cardiovascular:  Negative for chest pain.   Gastrointestinal:  Negative for abdominal pain, nausea and vomiting.   Genitourinary:  Negative for dysuria.   Musculoskeletal:  Positive for arthralgias. Negative for back pain.   Skin:  Negative for rash.   Neurological:  Negative for weakness.   Hematological:  Does not bruise/bleed easily.       Physical Exam     Initial Vitals [25 0742]   BP Pulse  Resp Temp SpO2   123/76 95 18 98.6 °F (37 °C) 98 %      MAP       --         Physical Exam    Nursing note and vitals reviewed.  Constitutional: She appears well-developed and well-nourished. She is not diaphoretic. No distress.   HENT:   Head: Normocephalic and atraumatic.   Eyes: Right eye exhibits no discharge. Left eye exhibits no discharge.   Neck: Neck supple.   Normal range of motion.  Cardiovascular:  Normal rate.           Pulmonary/Chest: No respiratory distress.   Abdominal: She exhibits no distension.   Musculoskeletal:      Cervical back: Normal range of motion and neck supple.      Comments: Right shoulder without any obvious deformity.  Distal pulses 2+.  Neurovascularly intact.     Neurological: She is alert and oriented to person, place, and time. She has normal strength.   Skin: Skin is warm and dry.   Psychiatric: She has a normal mood and affect. Her behavior is normal. Thought content normal.         ED Course   Procedures  Labs Reviewed - No data to display       Imaging Results              CT Head Without Contrast (Final result)  Result time 01/03/25 09:18:02      Final result by Dmaon GhoshKenji), MD (01/03/25 09:18:02)                   Impression:      No acute intracranial abnormality.    All CT scans at this facility use dose modulation, iterative reconstructions, and/or weight base dosing when appropriate to reduce radiation dose to as low as reasonably achievable.      Electronically signed by: Damon Ghosh MD  Date:    01/03/2025  Time:    09:18               Narrative:    EXAMINATION:  CT HEAD WITHOUT CONTRAST    CLINICAL HISTORY:  Headache, new or worsening (Age >= 50y);    TECHNIQUE:  Noncontrast images were obtained    COMPARISON:  CT brain 01/27/2019    FINDINGS:  No intracranial acute hemorrhage or acute focal brain parenchymal abnormality is identified.  Calvarium is intact.                                       X-Ray Shoulder Complete 2 View Right (Final result)   Result time 01/03/25 09:16:36      Final result by Damon GhoshKenji), MD (01/03/25 09:16:36)                   Impression:      Negative exam.      Electronically signed by: Damon Ghosh MD  Date:    01/03/2025  Time:    09:16               Narrative:    EXAMINATION:  XR SHOULDER COMPLETE 2 OR MORE VIEWS RIGHT    CLINICAL HISTORY:  Pain in right shoulder    TECHNIQUE:  Standard radiography performed.  Three views.    COMPARISON:  None    FINDINGS:  Bone density and architecture are normal.  No acute findings.                                       Medications   metoclopramide HCl tablet 10 mg (10 mg Oral Given 1/3/25 0930)     Medical Decision Making  Differential diagnosis  Headache, migraine, CVA, fracture, dislocation, strain    Amount and/or Complexity of Data Reviewed  Radiology: ordered.  Discussion of management or test interpretation with external provider(s): I discussed with patient that the evaluation in the emergency department does not suggest any emergent or life threatening medical condition requiring immediate intervention beyond what was provided in the ED, and I believe patient is safe for discharge.  Regardless, an unremarkable evaluation in the ED does not preclude the development or presence of a serious of life threatening condition. As such, patient was instructed to return immediately for any worsening or change in current symptoms. I also discussed the results of my evaluation and diagnosis with patient and she concurs with the evaluation and management plan.  Detailed written and verbal instructions provided to patient and she expressed a verbal understanding of the discharge instructions and overall management plan. Reiterated the importance of medication administration and safety and advised patient to follow up with primary care provider in 3-5 days or sooner if needed.  Also advised patient to return to the ER for any complications.       Risk  Prescription drug  management.                                      Clinical Impression:  Final diagnoses:  [M25.511] Right shoulder pain  [R51.9] Nonintractable headache, unspecified chronicity pattern, unspecified headache type (Primary)          ED Disposition Condition    Discharge Stable          ED Prescriptions       Medication Sig Dispense Start Date End Date Auth. Provider    metoclopramide HCl (REGLAN) 10 MG tablet Take 1 tablet (10 mg total) by mouth every 6 (six) hours as needed. 30 tablet 1/3/2025 -- Ralph Ríos NP          Follow-up Information       Follow up With Specialties Details Why Contact Info    pcp of choice   As needed     [facility] - Emergency Dept. Emergency Medicine  If symptoms worsen [id number] [facility] [address] [phone]             Ralph Ríos NP  01/03/25 3676

## 2025-01-04 ENCOUNTER — HOSPITAL ENCOUNTER (EMERGENCY)
Facility: HOSPITAL | Age: 55
Discharge: HOME | End: 2025-01-04
Attending: FAMILY MEDICINE
Payer: MEDICAID

## 2025-01-04 VITALS
TEMPERATURE: 98 F | RESPIRATION RATE: 16 BRPM | WEIGHT: 220.88 LBS | DIASTOLIC BLOOD PRESSURE: 71 MMHG | HEART RATE: 100 BPM | SYSTOLIC BLOOD PRESSURE: 114 MMHG | OXYGEN SATURATION: 97 % | BODY MASS INDEX: 31.7 KG/M2

## 2025-01-04 DIAGNOSIS — M25.551 RIGHT HIP PAIN: Primary | ICD-10-CM

## 2025-01-04 DIAGNOSIS — M25.511 ACUTE PAIN OF RIGHT SHOULDER: ICD-10-CM

## 2025-01-04 DIAGNOSIS — S76.011A STRAIN OF RIGHT HIP, INITIAL ENCOUNTER: ICD-10-CM

## 2025-01-04 DIAGNOSIS — S46.911A STRAIN OF RIGHT SHOULDER, INITIAL ENCOUNTER: ICD-10-CM

## 2025-01-04 PROCEDURE — 63600175 PHARM REV CODE 636 W HCPCS

## 2025-01-04 PROCEDURE — 99284 EMERGENCY DEPT VISIT MOD MDM: CPT | Mod: 25

## 2025-01-04 PROCEDURE — 96372 THER/PROPH/DIAG INJ SC/IM: CPT

## 2025-01-04 RX ORDER — DICLOFENAC SODIUM 75 MG/1
75 TABLET, DELAYED RELEASE ORAL 2 TIMES DAILY PRN
Qty: 14 TABLET | Refills: 0 | Status: SHIPPED | OUTPATIENT
Start: 2025-01-04 | End: 2025-01-11

## 2025-01-04 RX ORDER — KETOROLAC TROMETHAMINE 30 MG/ML
60 INJECTION, SOLUTION INTRAMUSCULAR; INTRAVENOUS
Status: COMPLETED | OUTPATIENT
Start: 2025-01-04 | End: 2025-01-04

## 2025-01-04 RX ORDER — DICLOFENAC SODIUM 75 MG/1
75 TABLET, DELAYED RELEASE ORAL 2 TIMES DAILY PRN
Qty: 14 TABLET | Refills: 0 | Status: SHIPPED | OUTPATIENT
Start: 2025-01-04 | End: 2025-01-04

## 2025-01-04 RX ADMIN — KETOROLAC TROMETHAMINE 60 MG: 30 INJECTION, SOLUTION INTRAMUSCULAR at 11:01

## 2025-01-04 NOTE — ED PROVIDER NOTES
Encounter Date: 2025       History     Chief Complaint   Patient presents with    Shoulder Pain     Right shoulder pain x 3 days. Fell 3 days ago, came to ER yesterday, states pain is worse. Also reports right groin pain.     54 year old female presents today with complaints of ongoing right shoulder pain and right groin/hip pain   The shoulder pain started secondary to a trip and fall 3-4 days ago, which has persisted today with no acute changes. Pain is localized to anterior shoulder and trap. Has not worsened.    She states that she has a known history of arthritis in the shoulder and is trying to get in with orthopedics for this in [facility]  Her right anterior hip/groin pain started last night. Denies any falls or injury to this area.   Denies fever, body aches, vaginal pain/lesions/discharge, neck pain, radicular complaints, b/b incontinence, saddle anesthesia   She has taken otc NSAID early this morning without relief   Patient has history of hysterectomy     The history is provided by the patient.     Review of patient's allergies indicates:   Allergen Reactions    Macrobid [nitrofurantoin monohyd/m-cryst] Hives    Nitrofurantoin macrocrystalline Hives and Itching     Past Medical History:   Diagnosis Date    Arthritis     Depression     Diabetes mellitus     GERD (gastroesophageal reflux disease)     Hypertension     Insomnia     Panic attack      Past Surgical History:   Procedure Laterality Date     SECTION      HYSTERECTOMY       Family History   Problem Relation Name Age of Onset    Birth defects Neg Hx       Social History     Tobacco Use    Smoking status: Every Day     Current packs/day: 0.25     Types: Cigarettes    Smokeless tobacco: Never   Substance Use Topics    Alcohol use: No    Drug use: No     Review of Systems   Constitutional:  Negative for fever.   HENT:  Negative for sore throat.    Respiratory:  Negative for shortness of breath.    Cardiovascular:  Negative for chest  pain.   Gastrointestinal:  Negative for nausea.   Genitourinary:  Negative for dysuria.   Musculoskeletal:  Positive for arthralgias. Negative for back pain.   Skin:  Negative for rash.   Neurological:  Negative for weakness.   Hematological:  Does not bruise/bleed easily.   All other systems reviewed and are negative.      Physical Exam     Initial Vitals [01/04/25 1127]   BP Pulse Resp Temp SpO2   (!) 109/59 101 18 98.6 °F (37 °C) 99 %      MAP       --         Physical Exam    Nursing note and vitals reviewed.  Constitutional: She appears well-developed and well-nourished.  Non-toxic appearance. She does not have a sickly appearance. She does not appear ill. No distress.   HENT:   Head: Normocephalic and atraumatic.   Right Ear: Hearing normal.   Left Ear: Hearing normal.   Nose: Nose normal. Mouth/Throat: Uvula is midline and oropharynx is clear and moist.   Eyes: Conjunctivae, EOM and lids are normal. Pupils are equal, round, and reactive to light.   Neck: Trachea normal. Neck supple.   Normal range of motion.   Full passive range of motion without pain.     Cardiovascular:  Normal rate, regular rhythm, normal heart sounds, intact distal pulses and normal pulses.           Pulmonary/Chest: Effort normal and breath sounds normal. No respiratory distress. She has no wheezes.   Abdominal: Abdomen is soft. Bowel sounds are normal. She exhibits no distension. There is no abdominal tenderness. There is no rebound and no guarding.   Musculoskeletal:      Right shoulder: Tenderness (anterior) present. No swelling, deformity, effusion or laceration. Decreased range of motion (due to pain). Normal strength. Normal pulse.      Left shoulder: Normal.      Cervical back: Normal, full passive range of motion without pain, normal range of motion and neck supple.      Right hip: Tenderness (anterior hip joint) present. No deformity, lacerations or crepitus. Decreased range of motion (pain with internal/external rotation).  Normal strength.      Left hip: Normal.      Comments: TTP over left trap also   Cervical spine nontender, full ROM   BUE full ROM and strength   Neurovascular status grossly intact BUE and BLE      Neurological: She is alert and oriented to person, place, and time. She has normal strength and normal reflexes. No cranial nerve deficit or sensory deficit. GCS eye subscore is 4. GCS verbal subscore is 5. GCS motor subscore is 6.   Skin: Skin is warm and dry.   Psychiatric: She has a normal mood and affect. Her behavior is normal. Thought content normal.         ED Course   Procedures  Labs Reviewed - No data to display       Imaging Results              X-Ray Hip 2 or 3 views Right with Pelvis when performed (Final result)  Result time 01/04/25 12:25:19      [facility] (01/04/25 12:25:19)                   Impression:      No acute bony changes.      Electronically signed by: Damon Ghosh MD  Date:    01/04/2025  Time:    12:25               Narrative:    EXAMINATION:  XR HIP WITH PELVIS WHEN PERFORMED 2 OR 3 VIEWS RIGHT    CLINICAL HISTORY:  Pain in right hip    TECHNIQUE:  Standard radiography performed.  Three views.    COMPARISON:  None    FINDINGS:  Bone density and architecture are normal.  No acute findings.  Previous ORIF of the proximal right femur.                                       Medications   ketorolac injection 60 mg (60 mg Intramuscular Given 1/4/25 1156)     Medical Decision Making  Imaging of right shoulder from yesterday reviewed today   Discussed results with patient   All questions were answered   Pain well controlled in ED   ER precautions discussed     Amount and/or Complexity of Data Reviewed  Radiology: ordered.    Risk  Prescription drug management.                                      Clinical Impression:  Final diagnoses:  [M25.551] Right hip pain (Primary)  [M25.511] Acute pain of right shoulder  [S46.911A] Strain of right shoulder, initial  encounter  [S76.011A] Strain of right hip, initial encounter          ED Disposition Condition    Discharge Stable          ED Prescriptions       Medication Sig Dispense Start Date End Date Auth. Provider    diclofenac (VOLTAREN) 75 MG EC tablet  (Status: Discontinued) Take 1 tablet (75 mg total) by mouth 2 (two) times daily as needed (pain). 14 tablet 1/4/2025 1/4/2025 Melvin Burton PA-C    diclofenac (VOLTAREN) 75 MG EC tablet Take 1 tablet (75 mg total) by mouth 2 (two) times daily as needed (pain). 14 tablet 1/4/2025 1/11/2025 Melvin Burton PA-C          Follow-up Information       Follow up With Specialties Details Why Contact Info    [facility] - Emergency Dept. Emergency Medicine  If symptoms worsen [id number] [facility] [address] [phone]             Melvin Burton PA-C  01/04/25 3423

## 2025-02-04 ENCOUNTER — HOSPITAL ENCOUNTER (EMERGENCY)
Facility: HOSPITAL | Age: 55
Discharge: HOME | End: 2025-02-04
Attending: EMERGENCY MEDICINE
Payer: MEDICAID

## 2025-02-04 VITALS
HEART RATE: 93 BPM | SYSTOLIC BLOOD PRESSURE: 119 MMHG | TEMPERATURE: 98 F | HEIGHT: 70 IN | BODY MASS INDEX: 32.19 KG/M2 | WEIGHT: 224.88 LBS | OXYGEN SATURATION: 99 % | DIASTOLIC BLOOD PRESSURE: 59 MMHG | RESPIRATION RATE: 18 BRPM

## 2025-02-04 DIAGNOSIS — R05.1 ACUTE COUGH: Primary | ICD-10-CM

## 2025-02-04 DIAGNOSIS — M79.10 MYALGIA: ICD-10-CM

## 2025-02-04 LAB
INFLUENZA A, MOLECULAR: NEGATIVE
INFLUENZA B, MOLECULAR: NEGATIVE
SARS-COV-2 RDRP RESP QL NAA+PROBE: NEGATIVE
SPECIMEN SOURCE: NORMAL

## 2025-02-04 PROCEDURE — 87502 INFLUENZA DNA AMP PROBE: CPT | Performed by: EMERGENCY MEDICINE

## 2025-02-04 PROCEDURE — 99284 EMERGENCY DEPT VISIT MOD MDM: CPT | Mod: 25

## 2025-02-04 PROCEDURE — 87635 SARS-COV-2 COVID-19 AMP PRB: CPT | Performed by: EMERGENCY MEDICINE

## 2025-02-04 RX ORDER — TRAMADOL HYDROCHLORIDE 50 MG/1
50 TABLET ORAL EVERY 6 HOURS PRN
Qty: 10 TABLET | Refills: 0 | Status: SHIPPED | OUTPATIENT
Start: 2025-02-04 | End: 2025-02-09

## 2025-02-04 RX ORDER — LEVOFLOXACIN 500 MG/1
500 TABLET, FILM COATED ORAL DAILY
Qty: 5 TABLET | Refills: 0 | Status: SHIPPED | OUTPATIENT
Start: 2025-02-04 | End: 2025-02-09

## 2025-02-04 RX ORDER — PROMETHAZINE HYDROCHLORIDE AND DEXTROMETHORPHAN HYDROBROMIDE 6.25; 15 MG/5ML; MG/5ML
5 SYRUP ORAL EVERY 6 HOURS PRN
Qty: 120 ML | Refills: 0 | Status: SHIPPED | OUTPATIENT
Start: 2025-02-04 | End: 2025-02-14

## 2025-02-04 NOTE — ED PROVIDER NOTES
"SCRIBE #1 NOTE: I, Domingo Del Real, am scribing for, and in the presence of, Frank Contreras MD. I have scribed the entire note.       History     Chief Complaint   Patient presents with    Generalized Body Aches    Cough     Onset Saturday, non-productive cough    Leg Swelling     Onset over the weekend. Pt states she is prescribed diuretics but stopped taking them because "they make her go to the bathroom too much"     Review of patient's allergies indicates:   Allergen Reactions    Macrobid [nitrofurantoin monohyd/m-cryst] Hives    Nitrofurantoin macrocrystalline Hives and Itching         History of Present Illness     HPI    2025, 6:56 AM  History obtained from the patient      History of Present Illness: Sonali Harrison is a 54 y.o. female patient with a PMHx of schizophrenia, HTN, depression, panic attacks, GERD, and DM who presents to the Emergency Department for evaluation of new onset cough which onset gradually this past Saturday. Per pt, she has an appointment scheduled with neurology tomorrow for her back pain. Pt has been taking NSAIDs and muscle relaxers without any improvement and is requesting for "something stronger." Pt is noncompliant with fluid pill since she often urinates on herself while sleeping and does not have leg swelling often. Symptoms are constant and moderate in severity. No mitigating or exacerbating factors reported. Patient denies any fever, CP, SOB, dizziness, and all other sxs at this time. No further complaints or concerns at this time.       Arrival mode: Personal vehicle     PCP: No, Primary Doctor        Past Medical History:  Past Medical History:   Diagnosis Date    Arthritis     Depression     Diabetes mellitus     GERD (gastroesophageal reflux disease)     Hypertension     Insomnia     Panic attack        Past Surgical History:  Past Surgical History:   Procedure Laterality Date     SECTION      HYSTERECTOMY           Family History:  Family " History   Problem Relation Name Age of Onset    Birth defects Neg Hx         Social History:  Social History     Tobacco Use    Smoking status: Every Day     Current packs/day: 0.25     Types: Cigarettes    Smokeless tobacco: Never   Substance and Sexual Activity    Alcohol use: No    Drug use: No    Sexual activity: Not Currently        Review of Systems     Review of Systems   Constitutional:  Negative for chills and fever.   HENT:  Negative for congestion and sore throat.    Respiratory:  Positive for cough. Negative for shortness of breath.    Cardiovascular:  Negative for chest pain.   Gastrointestinal:  Negative for abdominal pain, nausea and vomiting.   Genitourinary:  Negative for dysuria.   Musculoskeletal:  Positive for back pain (upper) and myalgias (generalized).   Skin:  Negative for rash.   Neurological:  Negative for dizziness, weakness, numbness and headaches.   Hematological:  Does not bruise/bleed easily.   All other systems reviewed and are negative.       Physical Exam     Initial Vitals [02/04/25 0649]   BP Pulse Resp Temp SpO2   129/70 107 14 98.2 °F (36.8 °C) 98 %      MAP       --          Physical Exam  Nursing Notes and Vital Signs Reviewed.  Constitutional: Patient is in no acute distress. Well-developed and well-nourished.  Head: Atraumatic. Normocephalic.  Eyes: PERRL. EOM intact. Conjunctivae are not pale. No scleral icterus.  ENT: Mucous membranes are moist. Oropharynx is clear and symmetric.    Neck: Supple. Full ROM. No lymphadenopathy.  Cardiovascular: Regular rate. Regular rhythm. No murmurs, rubs, or gallops. Distal pulses are 2+ and symmetric.  Pulmonary/Chest: No respiratory distress. Clear to auscultation bilaterally. No wheezing or rales.  Abdominal: Soft and non-distended.  There is no tenderness.  No rebound, guarding, or rigidity. Good bowel sounds.  Genitourinary: No CVA tenderness  Musculoskeletal: Moves all extremities. No obvious deformities. No edema. No calf  "tenderness.  Skin: Warm and dry.  Neurological:  Alert, awake, and appropriate.  Normal speech.  No acute focal neurological deficits are appreciated.  Psychiatric: Normal affect. Good eye contact. Appropriate in content.     ED Course   Procedures  ED Vital Signs:  Vitals:    02/04/25 0649 02/04/25 0715 02/04/25 0730 02/04/25 0800   BP: 129/70 (!) 141/73 122/63 108/81   Pulse: 107 97 97 98   Resp: 14 18 18 18   Temp: 98.2 °F (36.8 °C)      TempSrc: Oral      SpO2: 98% 100% 99% 100%   Weight: 102 kg (224 lb 13.9 oz)      Height: 5' 10" (1.778 m)       02/04/25 0824   BP: (!) 119/59   Pulse: 93   Resp: 18   Temp: 98.3 °F (36.8 °C)   TempSrc: Oral   SpO2: 99%   Weight:    Height:        Abnormal Lab Results:  Labs Reviewed   INFLUENZA A & B BY MOLECULAR       Result Value    Influenza A, Molecular Negative      Influenza B, Molecular Negative      Flu A & B Source Nasal swab     SARS-COV-2 RNA AMPLIFICATION, QUAL    SARS-CoV-2 RNA, Amplification, Qual Negative          All Lab Results:  Results for orders placed or performed during the hospital encounter of 02/04/25   Influenza A & B by Molecular    Collection Time: 02/04/25  7:12 AM    Specimen: Nasopharyngeal Swab   Result Value Ref Range    Influenza A, Molecular Negative Negative    Influenza B, Molecular Negative Negative    Flu A & B Source Nasal swab    COVID-19 Rapid Screening    Collection Time: 02/04/25  7:12 AM   Result Value Ref Range    SARS-CoV-2 RNA, Amplification, Qual Negative Negative         Imaging Results:  Imaging Results              X-Ray Chest PA And Lateral (Final result)  Result time 02/04/25 07:38:16      Final result by Rafael Kirk MD (02/04/25 07:38:16)                   Impression:      Low lung volumes.  Left basilar pneumonia not excluded.      Electronically signed by: Rafael Kirk  Date:    02/04/2025  Time:    07:38               Narrative:    EXAMINATION:  XR CHEST PA AND LATERAL    CLINICAL " HISTORY:  cough;    FINDINGS:  Comparison: 05/12/2023    Low lung volumes accentuate mediastinal silhouette and pulmonary parenchyma.  No pneumothorax.  No definite pleural effusion.  Left basilar airspace opacity is not excluded.  No acute osseous finding.                                                The Emergency Provider reviewed the vital signs and test results, which are outlined above.     ED Discussion       8:18 AM: Reassessed pt at this time.  Discussed with pt all pertinent ED information and results. Discussed pt dx and plan of tx. Gave pt all f/u and return to the ED instructions. All questions and concerns were addressed at this time. Pt expresses understanding of information and instructions, and is comfortable with plan to discharge. Pt is stable for discharge.    I discussed with patient and/or family/caretaker that evaluation in the ED does not suggest any emergent or life threatening medical conditions requiring immediate intervention beyond what was provided in the ED, and I believe patient is safe for discharge.  Regardless, an unremarkable evaluation in the ED does not preclude the development or presence of a serious of life threatening condition. As such, patient was instructed to return immediately for any worsening or change in current symptoms.         Medical Decision Making  DDx: covid, flu, pneumonia    Amount and/or Complexity of Data Reviewed  Labs: ordered. Decision-making details documented in ED Course.     Details: negative  Radiology: ordered. Decision-making details documented in ED Course.     Details: Possible LLL infiltrate    Risk  Prescription drug management.                ED Medication(s):  Medications - No data to display    Discharge Medication List as of 2/4/2025  8:16 AM        START taking these medications    Details   levoFLOXacin (LEVAQUIN) 500 MG tablet Take 1 tablet (500 mg total) by mouth once daily. for 5 days, Starting Tue 2/4/2025, Until Sun 2/9/2025,  Normal              Follow-up Information       [facility] In 2 days.    Contact information:  [address] [facility]  [place] [address] [phone]                                 Scribe Attestation:   Scribe #1: I performed the above scribed service and the documentation accurately describes the services I performed. I attest to the accuracy of the note.     Attending:   Physician Attestation Statement for Scribe #1: I, Frank Contreras MD, personally performed the services described in this documentation, as scribed by Domingo Del Real, in my presence, and it is both accurate and complete.           Clinical Impression       ICD-10-CM ICD-9-CM   1. Acute cough  R05.1 786.2   2. Myalgia  M79.10 729.1       Disposition:   Disposition: Discharged  Condition: Stable        Frank Contreras MD  02/04/25 0906

## 2025-04-12 ENCOUNTER — HOSPITAL ENCOUNTER (EMERGENCY)
Facility: HOSPITAL | Age: 55
Discharge: HOME | End: 2025-04-12
Attending: EMERGENCY MEDICINE
Payer: MEDICAID

## 2025-04-12 VITALS
SYSTOLIC BLOOD PRESSURE: 133 MMHG | RESPIRATION RATE: 14 BRPM | TEMPERATURE: 98 F | HEART RATE: 88 BPM | OXYGEN SATURATION: 100 % | DIASTOLIC BLOOD PRESSURE: 97 MMHG | WEIGHT: 218.25 LBS | BODY MASS INDEX: 31.32 KG/M2

## 2025-04-12 DIAGNOSIS — M25.559 HIP PAIN, UNSPECIFIED LATERALITY: ICD-10-CM
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
## 2025-04-12 LAB
ABSOLUTE EOSINOPHIL (OHS): 0.05 K/UL
ABSOLUTE MONOCYTE (OHS): 0.51 K/UL (ref 0.3–1)
ABSOLUTE NEUTROPHIL COUNT (OHS): 4.55 K/UL (ref 1.8–7.7)
ALBUMIN SERPL BCP-MCNC: 3.7 G/DL (ref 3.5–5.2)
ALP SERPL-CCNC: 96 UNIT/L (ref 40–150)
ALT SERPL W/O P-5'-P-CCNC: 48 UNIT/L (ref 10–44)
ANION GAP (OHS): 9 MMOL/L (ref 8–16)
AST SERPL-CCNC: 41 UNIT/L (ref 11–45)
BASOPHILS # BLD AUTO: 0.03 K/UL
BASOPHILS NFR BLD AUTO: 0.4 %
BILIRUB SERPL-MCNC: 0.3 MG/DL (ref 0.1–1)
BUN SERPL-MCNC: 11 MG/DL (ref 6–20)
CALCIUM SERPL-MCNC: 9.3 MG/DL (ref 8.7–10.5)
CHLORIDE SERPL-SCNC: 104 MMOL/L (ref 95–110)
CO2 SERPL-SCNC: 25 MMOL/L (ref 23–29)
CREAT SERPL-MCNC: 0.7 MG/DL (ref 0.5–1.4)
ERYTHROCYTE [DISTWIDTH] IN BLOOD BY AUTOMATED COUNT: 16 % (ref 11.5–14.5)
GFR SERPLBLD CREATININE-BSD FMLA CKD-EPI: >60 ML/MIN/1.73/M2
GLUCOSE SERPL-MCNC: 136 MG/DL (ref 70–110)
HCT VFR BLD AUTO: 45.4 % (ref 37–48.5)
HCV AB SERPL QL IA: NEGATIVE
HGB BLD-MCNC: 13.6 GM/DL (ref 12–16)
HIV 1+2 AB+HIV1 P24 AG SERPL QL IA: NEGATIVE
IMM GRANULOCYTES # BLD AUTO: 0.03 K/UL (ref 0–0.04)
IMM GRANULOCYTES NFR BLD AUTO: 0.4 % (ref 0–0.5)
LYMPHOCYTES # BLD AUTO: 2.67 K/UL (ref 1–4.8)
MCH RBC QN AUTO: 21.7 PG (ref 27–31)
MCHC RBC AUTO-ENTMCNC: 30 G/DL (ref 32–36)
MCV RBC AUTO: 72 FL (ref 82–98)
NUCLEATED RBC (/100WBC) (OHS): 0 /100 WBC
PLATELET # BLD AUTO: 385 K/UL (ref 150–450)
PMV BLD AUTO: 9.4 FL (ref 9.2–12.9)
POTASSIUM SERPL-SCNC: 3.9 MMOL/L (ref 3.5–5.1)
PROT SERPL-MCNC: 8 GM/DL (ref 6–8.4)
RBC # BLD AUTO: 6.27 M/UL (ref 4–5.4)
RELATIVE EOSINOPHIL (OHS): 0.6 %
RELATIVE LYMPHOCYTE (OHS): 34.1 % (ref 18–48)
RELATIVE MONOCYTE (OHS): 6.5 % (ref 4–15)
RELATIVE NEUTROPHIL (OHS): 58 % (ref 38–73)
SODIUM SERPL-SCNC: 138 MMOL/L (ref 136–145)
WBC # BLD AUTO: 7.84 K/UL (ref 3.9–12.7)

## 2025-04-12 PROCEDURE — 80053 COMPREHEN METABOLIC PANEL: CPT | Performed by: EMERGENCY MEDICINE

## 2025-04-12 PROCEDURE — 85025 COMPLETE CBC W/AUTO DIFF WBC: CPT | Performed by: EMERGENCY MEDICINE

## 2025-04-12 PROCEDURE — 99285 EMERGENCY DEPT VISIT HI MDM: CPT | Mod: 25

## 2025-04-12 PROCEDURE — 82962 GLUCOSE BLOOD TEST: CPT

## 2025-04-12 PROCEDURE — 87389 HIV-1 AG W/HIV-1&-2 AB AG IA: CPT | Performed by: EMERGENCY MEDICINE

## 2025-04-12 PROCEDURE — 25000003 PHARM REV CODE 250: Performed by: EMERGENCY MEDICINE

## 2025-04-12 PROCEDURE — 86803 HEPATITIS C AB TEST: CPT | Performed by: EMERGENCY MEDICINE

## 2025-04-12 RX ORDER — MECLIZINE HYDROCHLORIDE 25 MG/1
25 TABLET ORAL
Status: COMPLETED | OUTPATIENT
Start: 2025-04-12 | End: 2025-04-12

## 2025-04-12 RX ORDER — HYDROCODONE BITARTRATE AND ACETAMINOPHEN 5; 325 MG/1; MG/1
1 TABLET ORAL EVERY 4 HOURS PRN
Qty: 11 TABLET | Refills: 0 | Status: SHIPPED | OUTPATIENT
Start: 2025-04-12 | End: 2025-04-12

## 2025-04-12 RX ORDER — HYDROCODONE BITARTRATE AND ACETAMINOPHEN 5; 325 MG/1; MG/1
1 TABLET ORAL EVERY 4 HOURS PRN
Qty: 11 TABLET | Refills: 0 | Status: SHIPPED | OUTPATIENT
Start: 2025-04-12 | End: 2025-04-17

## 2025-04-12 RX ORDER — MECLIZINE HYDROCHLORIDE 25 MG/1
25 TABLET ORAL 3 TIMES DAILY PRN
Qty: 20 TABLET | Refills: 0 | Status: SHIPPED | OUTPATIENT
Start: 2025-04-12

## 2025-04-12 RX ORDER — MECLIZINE HYDROCHLORIDE 25 MG/1
25 TABLET ORAL 3 TIMES DAILY PRN
Qty: 20 TABLET | Refills: 0 | Status: SHIPPED | OUTPATIENT
Start: 2025-04-12 | End: 2025-04-12

## 2025-04-12 RX ADMIN — MECLIZINE HYDROCHLORIDE 25 MG: 25 TABLET ORAL at 09:04

## 2025-04-12 NOTE — ED PROVIDER NOTES
SCRIBE #1 NOTE: I, Bel Swift, am scribing for, and in the presence of, Frank Contreras MD. I have scribed the entire note.       History     Chief Complaint   Patient presents with    Fall     Fell while in the bathtub hitting the wall. Denies hitting head or loss of consciousness. Denies blood thinners. C/o right back pain and right groin pain. Also reports dizziness. Reports hx of DM.     Review of patient's allergies indicates:   Allergen Reactions    Macrobid [nitrofurantoin monohyd/m-cryst] Hives    Nitrofurantoin macrocrystalline Hives and Itching         History of Present Illness     HPI    2025, 8:22 AM  History obtained from the patient and medical records      History of Present Illness: Sonali Harrison is a 54 y.o. female patient with a PMHx of GERD, DM, arthritis, and HTN who presents to the Emergency Department for evaluation after a fall which occurred just PTA. Pt reports she fell while in the bathtub and hit the wall. Pt denies hitting her head or LOC. Pt c/o right sided back pain and right sided groin pain. Symptoms are constant and moderate in severity. No mitigating or exacerbating factors reported. Associated sxs include dizziness which pt has had for a while before the fall. Patient denies any headaches, neck pain, N/V, CP, or photophobia. No prior Tx specified. Pt denies use of blood thinners. No further complaints or concerns at this time.       Arrival mode: Personal Transportation    PCP: No, Primary Doctor        Past Medical History:  Past Medical History:   Diagnosis Date    Arthritis     Depression     Diabetes mellitus     GERD (gastroesophageal reflux disease)     Hypertension     Insomnia     Panic attack        Past Surgical History:  Past Surgical History:   Procedure Laterality Date     SECTION      HYSTERECTOMY           Family History:  Family History   Problem Relation Name Age of Onset    Birth defects Neg Hx         Social History:  Social  History     Tobacco Use    Smoking status: Every Day     Current packs/day: 0.25     Types: Cigarettes    Smokeless tobacco: Never   Substance and Sexual Activity    Alcohol use: No    Drug use: No    Sexual activity: Not Currently        Review of Systems     Review of Systems   Constitutional:  Negative for chills and fever.   HENT:  Negative for sore throat.    Eyes:  Negative for photophobia.   Respiratory:  Negative for shortness of breath.    Cardiovascular:  Negative for chest pain.   Gastrointestinal:  Negative for nausea and vomiting.   Genitourinary:  Negative for dysuria.        (+) groin pain (R sided)   Musculoskeletal:  Positive for back pain (R sided). Negative for neck pain.   Skin:  Negative for rash.   Neurological:  Positive for dizziness. Negative for syncope, weakness and headaches.   Hematological:  Does not bruise/bleed easily.   All other systems reviewed and are negative.     Physical Exam     Initial Vitals [04/12/25 0811]   BP Pulse Resp Temp SpO2   120/72 88 16 97.8 °F (36.6 °C) 99 %      MAP       --          Physical Exam  Nursing Notes and Vital Signs Reviewed.  Constitutional: Patient is in no acute distress. Well-developed and well-nourished.  Head: Atraumatic. Normocephalic.  Eyes: PERRL. EOM intact. Conjunctivae are not pale. No scleral icterus.  ENT: Mucous membranes are moist. Oropharynx is clear and symmetric.    Neck: Supple. Full ROM. No lymphadenopathy.  Cardiovascular: Regular rate. Regular rhythm. No murmurs, rubs, or gallops. Distal pulses are 2+ and symmetric.  Pulmonary/Chest: No respiratory distress. Clear to auscultation bilaterally. No wheezing or rales.  Abdominal: Soft and non-distended.  There is no tenderness.  No rebound, guarding, or rigidity. Good bowel sounds.  Genitourinary: No CVA tenderness.  Musculoskeletal: Moves all extremities. No obvious deformities. No edema. No calf tenderness.  Skin: Warm and dry.  Neurological:  Alert, awake, and appropriate.   Normal speech.  No acute focal neurological deficits are appreciated.  Psychiatric: Normal affect. Good eye contact. Appropriate in content.     ED Course   Procedures  ED Vital Signs:  Vitals:    04/12/25 0811 04/12/25 1000   BP: 120/72 (!) 133/97   Pulse: 88 88   Resp: 16 14   Temp: 97.8 °F (36.6 °C)    TempSrc: Oral    SpO2: 99% 100%   Weight: 99 kg (218 lb 4.1 oz)        Abnormal Lab Results:  Labs Reviewed   COMPREHENSIVE METABOLIC PANEL - Abnormal       Result Value    Sodium 138      Potassium 3.9      Chloride 104      CO2 25      Glucose 136 (*)     BUN 11      Creatinine 0.7      Calcium 9.3      Protein Total 8.0      Albumin 3.7      Bilirubin Total 0.3      ALP 96      AST 41      ALT 48 (*)     Anion Gap 9      eGFR >60     CBC WITH DIFFERENTIAL - Abnormal    WBC 7.84      RBC 6.27 (*)     HGB 13.6      HCT 45.4      MCV 72 (*)     MCH 21.7 (*)     MCHC 30.0 (*)     RDW 16.0 (*)     Platelet Count 385      MPV 9.4      Nucleated RBC 0      Neut % 58.0      Lymph % 34.1      Mono % 6.5      Eos % 0.6      Basophil % 0.4      Imm Grans % 0.4      Neut # 4.55      Lymph # 2.67      Mono # 0.51      Eos # 0.05      Baso # 0.03      Imm Grans # 0.03     HEPATITIS C ANTIBODY - Normal    Hep C Ab Interp Negative     HIV 1 / 2 ANTIBODY - Normal    HIV 1/2 Ag/Ab Negative     CBC W/ AUTO DIFFERENTIAL    Narrative:     The following orders were created for panel order CBC Auto Differential.  Procedure                               Abnormality         Status                     ---------                               -----------         ------                     CBC with Differential[[id number]]       Abnormal            Final result                 Please view results for these tests on the individual orders.   HEP C VIRUS HOLD SPECIMEN   URINALYSIS        All Lab Results:  Results for orders placed or performed during the hospital encounter of 04/12/25   Hepatitis C Antibody    Collection Time: 04/12/25  8:38  AM   Result Value Ref Range    Hep C Ab Interp Negative Negative   HIV 1/2 Ag/Ab (4th Gen)    Collection Time: 04/12/25  8:38 AM   Result Value Ref Range    HIV 1/2 Ag/Ab Negative Negative   Comprehensive Metabolic Panel    Collection Time: 04/12/25  8:38 AM   Result Value Ref Range    Sodium 138 136 - 145 mmol/L    Potassium 3.9 3.5 - 5.1 mmol/L    Chloride 104 95 - 110 mmol/L    CO2 25 23 - 29 mmol/L    Glucose 136 (H) 70 - 110 mg/dL    BUN 11 6 - 20 mg/dL    Creatinine 0.7 0.5 - 1.4 mg/dL    Calcium 9.3 8.7 - 10.5 mg/dL    Protein Total 8.0 6.0 - 8.4 gm/dL    Albumin 3.7 3.5 - 5.2 g/dL    Bilirubin Total 0.3 0.1 - 1.0 mg/dL    ALP 96 40 - 150 unit/L    AST 41 11 - 45 unit/L    ALT 48 (H) 10 - 44 unit/L    Anion Gap 9 8 - 16 mmol/L    eGFR >60 >60 mL/min/1.73/m2   CBC with Differential    Collection Time: 04/12/25  8:38 AM   Result Value Ref Range    WBC 7.84 3.90 - 12.70 K/uL    RBC 6.27 (H) 4.00 - 5.40 M/uL    HGB 13.6 12.0 - 16.0 gm/dL    HCT 45.4 37.0 - 48.5 %    MCV 72 (L) 82 - 98 fL    MCH 21.7 (L) 27.0 - 31.0 pg    MCHC 30.0 (L) 32.0 - 36.0 g/dL    RDW 16.0 (H) 11.5 - 14.5 %    Platelet Count 385 150 - 450 K/uL    MPV 9.4 9.2 - 12.9 fL    Nucleated RBC 0 <=0 /100 WBC    Neut % 58.0 38 - 73 %    Lymph % 34.1 18 - 48 %    Mono % 6.5 4 - 15 %    Eos % 0.6 <=8 %    Basophil % 0.4 <=1.9 %    Imm Grans % 0.4 0.0 - 0.5 %    Neut # 4.55 1.8 - 7.7 K/uL    Lymph # 2.67 1 - 4.8 K/uL    Mono # 0.51 0.3 - 1 K/uL    Eos # 0.05 <=0.5 K/uL    Baso # 0.03 <=0.2 K/uL    Imm Grans # 0.03 0.00 - 0.04 K/uL       Imaging Results:  Imaging Results              X-Ray Hip 2 or 3 views Right with Pelvis when performed (Final result)  Result time 04/12/25 09:01:41      Final result by Donovan Jacobson MD (04/12/25 09:01:41)                   Impression:     As above.    Finalized on: 4/12/2025 9:01 AM By:  Donovan Jacobson MD  [facility]# [id number]      2025-04-12 09:03:44.901     [facility]               Narrative:    EXAM: XR HIP WITH PELVIS  WHEN PERFORMED 2 OR 3 VIEWS RIGHT    CLINICAL HISTORY:  Right hip pain.    FINDINGS: Status post right hip fixation.  Moderate degenerative change.  No segmental malalignment.  No acute displaced fracture.  The sacroiliac joints and pubic symphysis are within normal limits.                                         X-Ray Lumbar Spine Ap And Lateral (Final result)  Result time 04/12/25 09:00:56      Final result by Donovan Jacobson MD (04/12/25 09:00:56)                   Impression:     As above.    Finalized on: 4/12/2025 9:00 AM By:  Donovan Jacobson MD  [facility]# [id number]      2025-04-12 09:03:04.889     [facility]               Narrative:    EXAM:  XR LUMBAR SPINE AP AND LATERAL    CLINICAL HISTORY: None given    FINDINGS:    Lumbar vertebral body height and alignment are normal.  No evidence of acute fracture.  Mild degenerative changes not unexpected for age.                                         CT Head Without Contrast (Final result)  Result time 04/12/25 08:55:22      Final result by Jose Brown MD (04/12/25 08:55:22)                   Impression:     Normal head CT.      All CT scans at [this location] are performed using dose modulation techniques as appropriate to a performed exam including the following:  Automated exposure control; adjustment of the mA and/or kV according to patient size (this includes techniques or standardized protocols for targeted exams where dose is matched to indication / reason for exam; i.e. extremities or head); use of iterative reconstruction technique.    Finalized on: 4/12/2025 8:55 AM By:  Jose Brown MD  [facility]# [id number]      2025-04-12 08:57:24.553     [facility]               Narrative:    EXAM:  CT HEAD WITHOUT CONTRAST    CLINICAL INDICATION: Trauma.  Injury to head.    TECHNIQUE:  Routine noncontrast head CT.    COMPARISON STUDY:  None.    FINDINGS: There is no acute intracranial hemorrhage or extra-axial fluid collection.    There is no abnormal increased or decreased density  within the brain parenchyma.  Gray-white differentiation is well-preserved.  The ventricles are unremarkable.  There is no intracranial mass or mass effect.    The calvarium is intact.    No scalp swelling.    The visualized paranasal sinuses and mastoids are well-aerated.                                           An EKG was not ordered.            The Emergency Provider reviewed the vital signs and test results, which are outlined above.     ED Discussion     9:35 AM: Reassessed pt at this time. Discussed with patient and/or family/caretaker all pertinent ED information and results. Discussed pt dx and plan of tx. Gave the patient all f/u and return to the ED instructions. All questions and concerns were addressed at this time. Patient and/or family/caretaker expresses understanding of information and instructions, and is comfortable with plan to discharge. Pt is stable for discharge.     I discussed with patient and/or family/caretaker that evaluation in the ED does not suggest any emergent or life threatening medical conditions requiring immediate intervention beyond what was provided in the ED, and I believe patient is safe for discharge.  Regardless, an unremarkable evaluation in the ED does not preclude the development or presence of a serious of life threatening condition. As such, I instructed that the patient is to return immediately for any worsening or change in current symptoms.         Medical Decision Making  DDx: fall, hip pain    Amount and/or Complexity of Data Reviewed  Labs: ordered. Decision-making details documented in ED Course.  Radiology: ordered. Decision-making details documented in ED Course.    Risk  Prescription drug management.                ED Medication(s):  Medications   meclizine tablet 25 mg (25 mg Oral Given 4/12/25 0930)       Discharge Medication List as of 4/12/2025  9:35 AM        START taking these medications    Details   HYDROcodone-acetaminophen (NORCO) 5-325 mg per  tablet Take 1 tablet by mouth every 4 (four) hours as needed for Pain., Starting Sat 4/12/2025, Until Thu 4/17/2025 at 2359, Normal              Follow-up Information       [facility], [facility] In 2 days.    Contact information:  [address]  [place] [address]  [phone]                                 Scribe Attestation:   Scribe #1: I performed the above scribed service and the documentation accurately describes the services I performed. I attest to the accuracy of the note.     Attending:   Physician Attestation Statement for Scribe #1: I, Frank Contreras MD, personally performed the services described in this documentation, as scribed by Bel Swift, in my presence, and it is both accurate and complete.           Clinical Impression       ICD-10-CM ICD-9-CM   1. Fall, initial encounter  W19.XXXA E888.9   2. Hip pain, unspecified laterality  M25.559 719.45       Disposition:   Disposition: Discharged  Condition: Stable         Frank Contreras MD  04/12/25 1044

## 2025-04-13 LAB — POCT GLUCOSE: 153 MG/DL (ref 70–110)

## 2025-05-13 ENCOUNTER — HOSPITAL ENCOUNTER (EMERGENCY)
Facility: HOSPITAL | Age: 55
Discharge: HOME | End: 2025-05-13
Attending: EMERGENCY MEDICINE
Payer: MEDICAID

## 2025-05-13 VITALS
RESPIRATION RATE: 18 BRPM | SYSTOLIC BLOOD PRESSURE: 125 MMHG | WEIGHT: 217.81 LBS | HEIGHT: 70 IN | BODY MASS INDEX: 31.18 KG/M2 | OXYGEN SATURATION: 100 % | TEMPERATURE: 99 F | DIASTOLIC BLOOD PRESSURE: 82 MMHG | HEART RATE: 88 BPM

## 2025-05-13 DIAGNOSIS — G89.29 CHRONIC RIGHT-SIDED LOW BACK PAIN WITHOUT SCIATICA: Primary | ICD-10-CM

## 2025-05-13 DIAGNOSIS — M54.50 CHRONIC RIGHT-SIDED LOW BACK PAIN WITHOUT SCIATICA: Primary | ICD-10-CM

## 2025-05-13 LAB — POCT GLUCOSE: 171 MG/DL (ref 70–110)

## 2025-05-13 PROCEDURE — 63600175 PHARM REV CODE 636 W HCPCS: Mod: JZ,TB | Performed by: EMERGENCY MEDICINE

## 2025-05-13 PROCEDURE — 99284 EMERGENCY DEPT VISIT MOD MDM: CPT | Mod: 25

## 2025-05-13 PROCEDURE — 96372 THER/PROPH/DIAG INJ SC/IM: CPT | Performed by: EMERGENCY MEDICINE

## 2025-05-13 PROCEDURE — 82962 GLUCOSE BLOOD TEST: CPT

## 2025-05-13 PROCEDURE — 25000003 PHARM REV CODE 250: Performed by: EMERGENCY MEDICINE

## 2025-05-13 RX ORDER — ONDANSETRON 4 MG/1
4 TABLET, ORALLY DISINTEGRATING ORAL
Status: COMPLETED | OUTPATIENT
Start: 2025-05-13 | End: 2025-05-13

## 2025-05-13 RX ORDER — KETOROLAC TROMETHAMINE 30 MG/ML
15 INJECTION, SOLUTION INTRAMUSCULAR; INTRAVENOUS
Status: COMPLETED | OUTPATIENT
Start: 2025-05-13 | End: 2025-05-13

## 2025-05-13 RX ORDER — HYDROCODONE BITARTRATE AND ACETAMINOPHEN 5; 325 MG/1; MG/1
1 TABLET ORAL
Refills: 0 | Status: COMPLETED | OUTPATIENT
Start: 2025-05-13 | End: 2025-05-13

## 2025-05-13 RX ORDER — HYDROCODONE BITARTRATE AND ACETAMINOPHEN 5; 325 MG/1; MG/1
1 TABLET ORAL EVERY 4 HOURS PRN
Qty: 11 TABLET | Refills: 0 | Status: SHIPPED | OUTPATIENT
Start: 2025-05-13 | End: 2025-05-18

## 2025-05-13 RX ADMIN — ONDANSETRON 4 MG: 4 TABLET, ORALLY DISINTEGRATING ORAL at 08:05

## 2025-05-13 RX ADMIN — HYDROCODONE BITARTRATE AND ACETAMINOPHEN 1 TABLET: 5; 325 TABLET ORAL at 08:05

## 2025-05-13 RX ADMIN — KETOROLAC TROMETHAMINE 15 MG: 30 INJECTION, SOLUTION INTRAMUSCULAR at 07:05

## 2025-05-13 NOTE — ED NOTES
LPN Initial ER Focused Assessment Approval    As the Registered Nurse (RN), I have thoroughly reviewed the Licensed Practical Nurse (LPN) initial emergency room focused assessment for Sonali Harrison.     After careful evaluation, I confirm that the documented assessment is an accurate reflection of the patient's condition.

## 2025-05-13 NOTE — ED PROVIDER NOTES
SCRIBE #1 NOTE: I, Janiya Arriaza, am scribing for, and in the presence of, Frank Contreras MD. I have scribed the entire note.       History     Chief Complaint   Patient presents with    Back Pain     Pt c/o chronic lower back pain. PMHx bulging discs and spinal stenosis. Pt states the pain also radiates to her right groin area.      Review of patient's allergies indicates:   Allergen Reactions    Macrobid [nitrofurantoin monohyd/m-cryst] Hives    Nitrofurantoin macrocrystalline Hives and Itching         History of Present Illness     HPI    2025, 7:09 AM  History obtained from the patient      History of Present Illness: Sonali Harrison is a 55 y.o. female patient with a PMHx of arthritis, DM, HTN, bulging discs and spinal stenosis who presents to the Emergency Department for evaluation of back pain. Patient states her pain has been going on for a while now, and she is looking for some sort of relief. She also states she has right groin pain when she walks.  Symptoms are constant and moderate in severity. No associated sxs included. No prior Tx included. No further complaints or concerns at this time.       Arrival mode: Personal Transportation    PCP: No, Primary Doctor        Past Medical History:  Past Medical History:   Diagnosis Date    Arthritis     Depression     Diabetes mellitus     GERD (gastroesophageal reflux disease)     Hypertension     Insomnia     Panic attack        Past Surgical History:  Past Surgical History:   Procedure Laterality Date     SECTION      HYSTERECTOMY           Family History:  Family History   Problem Relation Name Age of Onset    Birth defects Neg Hx         Social History:  Social History     Tobacco Use    Smoking status: Every Day     Current packs/day: 0.25     Types: Cigarettes    Smokeless tobacco: Never   Substance and Sexual Activity    Alcohol use: No    Drug use: No    Sexual activity: Not Currently        Review of Systems     Review of  Systems   Constitutional:  Negative for fever.   HENT:  Negative for sore throat.    Respiratory:  Negative for shortness of breath.    Cardiovascular:  Negative for chest pain.   Gastrointestinal:  Negative for nausea.   Genitourinary:  Negative for dysuria.        (+) right groin pain   Musculoskeletal:  Positive for back pain (lower).   Skin:  Negative for rash.   Neurological:  Negative for weakness.   Hematological:  Does not bruise/bleed easily.   All other systems reviewed and are negative.     Physical Exam     Initial Vitals [05/13/25 0645]   BP Pulse Resp Temp SpO2   116/77 100 19 98.6 °F (37 °C) 97 %      MAP       --          Physical Exam  Nursing Notes and Vital Signs Reviewed.  Constitutional: Patient is in no acute distress. Well-developed and well-nourished.  Head: Atraumatic. Normocephalic.  Eyes: PERRL. EOM intact. Conjunctivae are not pale. No scleral icterus.  ENT: Mucous membranes are moist. Oropharynx is clear and symmetric.    Neck: Supple. Full ROM. No lymphadenopathy.  Cardiovascular: Regular rate. Regular rhythm. No murmurs, rubs, or gallops. Distal pulses are 2+ and symmetric.  Pulmonary/Chest: No respiratory distress. Clear to auscultation bilaterally. No wheezing or rales.  Abdominal: Soft and non-distended.  There is no tenderness.  No rebound, guarding, or rigidity. Good bowel sounds.  Genitourinary: No CVA tenderness.  Musculoskeletal: Moves all extremities. No obvious deformities. No edema. No calf tenderness. Positive straight leg raise test.   Skin: Warm and dry.  Neurological:  Alert, awake, and appropriate.  Normal speech.  No acute focal neurological deficits are appreciated.  Psychiatric: Normal affect. Good eye contact. Appropriate in content.     ED Course   Procedures  ED Vital Signs:  Vitals:    05/13/25 0645 05/13/25 0715 05/13/25 0725 05/13/25 0747   BP: 116/77 122/87  128/82   Pulse: 100 97 97 75   Resp: 19 18  18   Temp: 98.6 °F (37 °C)   98.7 °F (37.1 °C)   TempSrc:  "Oral   Oral   SpO2: 97% 100%     Weight: 98.8 kg (217 lb 12.8 oz)      Height: 5' 10" (1.778 m)       05/13/25 0839 05/13/25 0915   BP: (!) 145/94 125/82   Pulse: 84 88   Resp: 18 18   Temp:  98.7 °F (37.1 °C)   TempSrc:  Oral   SpO2: 97% 100%   Weight:     Height:         Abnormal Lab Results:  Labs Reviewed   POCT GLUCOSE - Abnormal       Result Value    POCT Glucose 171 (*)         All Lab Results:  Results for orders placed or performed during the hospital encounter of 05/13/25   POCT glucose    Collection Time: 05/13/25  8:43 AM   Result Value Ref Range    POCT Glucose 171 (H) 70 - 110 mg/dL       Imaging Results:  Imaging Results              CT Lumbar Spine Without Contrast (Final result)  Result time 05/13/25 08:38:38      Final result by Chato Zelaya MD (05/13/25 08:38:38)                   Impression:      No acute abnormality.  Multilevel degenerative findings above most pronounced involving the posterior facet joints with L5-S1 foraminal stenosis.    All CT scans at this facility are performed  using dose modulation techniques as appropriate to performed exam including the following:  automated exposure control; adjustment of mA and/or kV according to the patients size (this includes techniques or standardized protocols for targeted exams where dose is matched to indication/reason for exam: i.e. extremities or head);  iterative reconstruction technique.      Electronically signed by: Chato Zelaya MD  Date:    05/13/2025  Time:    08:38               Narrative:    EXAMINATION:  CT LUMBAR SPINE WITHOUT CONTRAST    CLINICAL HISTORY:  Low back pain, increased fracture risk;    TECHNIQUE:  Standard noncontrast CT scan of the lumbar spine.    COMPARISON:  X-ray 04/12/2025    FINDINGS:  Overall lumbar alignment is normal.    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    T12-L1: Mild hypertrophic facet arthrosis.    L1-2: Mild hypertrophic facet arthrosis.    L2-3: Minor " disc bulge with mild hypertrophic facet arthrosis.    L3-4: Minor disc bulge with mild hypertrophic facet arthrosis.    L4-5: Mild disc bulge with mild hypertrophic facet arthrosis with vacuum joint phenomenon.    L5-S1: Mild disc bulge with severe hypertrophic facet arthrosis.  Associated degenerative vacuum joint phenomenon.  Mild right and moderate left foraminal stenosis.                                       No EKG ordered.           The Emergency Provider reviewed the vital signs and test results, which are outlined above.     ED Discussion       9:05 AM: Reassessed pt at this time. Discussed with patient and/or family/caretaker all pertinent ED information and results. Discussed pt dx and plan of tx. Gave the patient all f/u and return to the ED instructions. All questions and concerns were addressed at this time. Patient and/or family/caretaker expresses understanding of information and instructions, and is comfortable with plan to discharge. Pt is stable for discharge.     I discussed with patient and/or family/caretaker that evaluation in the ED does not suggest any emergent or life threatening medical conditions requiring immediate intervention beyond what was provided in the ED, and I believe patient is safe for discharge.  Regardless, an unremarkable evaluation in the ED does not preclude the development or presence of a serious of life threatening condition. As such, I instructed that the patient is to return immediately for any worsening or change in current symptoms.       Medical Decision Making  DDx: back pain, sciatica    Amount and/or Complexity of Data Reviewed  Labs: ordered. Decision-making details documented in ED Course.  Radiology: ordered. Decision-making details documented in ED Course.  Discussion of management or test interpretation with external provider(s): Feeling better after treatment in the ED, and ready to go home.     Risk  Prescription drug management.                ED  Medication(s):  Medications   ketorolac injection 15 mg (15 mg Intramuscular Given 5/13/25 0716)   HYDROcodone-acetaminophen 5-325 mg per tablet 1 tablet (1 tablet Oral Given 5/13/25 0839)   ondansetron disintegrating tablet 4 mg (4 mg Oral Given 5/13/25 0839)       Discharge Medication List as of 5/13/2025  9:07 AM        START taking these medications    Details   HYDROcodone-acetaminophen (NORCO) 5-325 mg per tablet Take 1 tablet by mouth every 4 (four) hours as needed for Pain., Starting Tue 5/13/2025, Until Sun 5/18/2025 at 2359, Normal              Follow-up Information       [facility], [facility] In 2 days.    Contact information:  [address] [address] [phone]                                 Scribe Attestation:   Scribe #1: I performed the above scribed service and the documentation accurately describes the services I performed. I attest to the accuracy of the note.     Attending:   Physician Attestation Statement for Scribe #1: I, Frank Contreras MD, personally performed the services described in this documentation, as scribed by Janiya Arriaza, in my presence, and it is both accurate and complete.           Clinical Impression       ICD-10-CM ICD-9-CM   1. Chronic right-sided low back pain without sciatica  M54.50 724.2    G89.29 338.29       Disposition:   Disposition: Discharged  Condition: Stable       Frank Contreras MD  05/13/25 3889

## 2025-07-08 ENCOUNTER — HOSPITAL ENCOUNTER (EMERGENCY)
Facility: HOSPITAL | Age: 55
Discharge: HOME | End: 2025-07-08
Attending: EMERGENCY MEDICINE
Payer: MEDICAID

## 2025-07-08 VITALS
RESPIRATION RATE: 16 BRPM | OXYGEN SATURATION: 98 % | TEMPERATURE: 98 F | DIASTOLIC BLOOD PRESSURE: 83 MMHG | BODY MASS INDEX: 31.25 KG/M2 | HEIGHT: 70 IN | SYSTOLIC BLOOD PRESSURE: 124 MMHG | WEIGHT: 218.25 LBS | HEART RATE: 88 BPM

## 2025-07-08 DIAGNOSIS — S62.664A CLOSED NONDISPLACED FRACTURE OF DISTAL PHALANX OF RIGHT RING FINGER, INITIAL ENCOUNTER: ICD-10-CM

## 2025-07-08 DIAGNOSIS — S92.354A CLOSED NONDISPLACED FRACTURE OF FIFTH METATARSAL BONE OF RIGHT FOOT, INITIAL ENCOUNTER: ICD-10-CM

## 2025-07-08 DIAGNOSIS — M79.641 HAND PAIN, RIGHT: Primary | ICD-10-CM

## 2025-07-08 PROCEDURE — 99283 EMERGENCY DEPT VISIT LOW MDM: CPT | Mod: 25

## 2025-07-08 PROCEDURE — 25000003 PHARM REV CODE 250

## 2025-07-08 PROCEDURE — 29125 APPL SHORT ARM SPLINT STATIC: CPT | Mod: RT

## 2025-07-08 RX ORDER — TRAMADOL HYDROCHLORIDE 50 MG/1
50 TABLET, FILM COATED ORAL
Status: COMPLETED | OUTPATIENT
Start: 2025-07-08 | End: 2025-07-08

## 2025-07-08 RX ORDER — HYDROCODONE BITARTRATE AND ACETAMINOPHEN 5; 325 MG/1; MG/1
1 TABLET ORAL EVERY 6 HOURS PRN
Qty: 12 TABLET | Refills: 0 | Status: SHIPPED | OUTPATIENT
Start: 2025-07-08 | End: 2025-07-11

## 2025-07-08 RX ADMIN — TRAMADOL HYDROCHLORIDE 50 MG: 50 TABLET, COATED ORAL at 09:07

## 2025-07-08 NOTE — ED NOTES
Patient examined, evaluated, and then educated on discharge instructions and prescriptions by VIVI Shah without medic assistance. Patient discharged to [facility] by VIVI Shah.

## 2025-07-08 NOTE — ED PROVIDER NOTES
ED Provider Note - 2025    History     Chief Complaint   Patient presents with    Fall     Pt states she had a trip and fall on . Pt denies LOC and thinners. Pt states her whole right side is hurting and she is having dizzy spells     Patient with a previous medical history of anxiety, hypertension, GERD, diabetes, depression, arthritis currently presents with a chief complaint of injury to the right hand.  This was acquired 2 days ago on  evening.  as a result of ground level slip and fall with right hand outstretched.  Patient notes associated symptoms of pain and swelling.  Denies associated numbness, deformity, abrasion, laceration, and loss of ROM.  Patient reports no relief with Tylenol at home prior to arrival.         Review of patient's allergies indicates:   Allergen Reactions    Macrobid [nitrofurantoin monohyd/m-cryst] Hives    Nitrofurantoin macrocrystalline Hives and Itching     Past Medical History:   Diagnosis Date    Arthritis     Depression     Diabetes mellitus     GERD (gastroesophageal reflux disease)     Hypertension     Insomnia     Panic attack      Past Surgical History:   Procedure Laterality Date     SECTION      HYSTERECTOMY       Family History   Problem Relation Name Age of Onset    Birth defects Neg Hx       Social History[1]     Review of Systems   Constitutional:  Negative for fever.   HENT:  Negative for sore throat.    Respiratory:  Negative for shortness of breath.    Cardiovascular:  Negative for chest pain.   Gastrointestinal:  Negative for nausea.   Genitourinary:  Negative for dysuria.   Musculoskeletal:  Negative for back pain.        + right hand pain and swelling   Skin:  Negative for rash and wound.   Neurological:  Negative for dizziness, syncope, weakness and numbness.   Hematological:  Does not bruise/bleed easily.     The patient's list of active medical problems, social history, medications, and allergies as documented per RN staff has been  "reviewed.     Physical Exam     Vitals:    07/08/25 0826 07/08/25 0906   BP: 124/83    Pulse: 88    Resp: 16 16   Temp: 98.3 °F (36.8 °C)    TempSrc: Oral    SpO2: 98%    Weight: 99 kg (218 lb 4.1 oz)    Height: 5' 10" (1.778 m)      Physical Exam    Constitutional: She appears well-developed. She is cooperative. No distress.   HENT:   Head: Normocephalic and atraumatic.   Eyes: EOM are normal. Pupils are equal, round, and reactive to light.   Neck: Neck supple.   Normal range of motion.   Full passive range of motion without pain.     Cardiovascular:  Normal rate and regular rhythm.           Pulmonary/Chest: No respiratory distress.   Abdominal: Abdomen is soft.   Musculoskeletal:      Right shoulder: Normal.      Right elbow: Normal.      Right wrist: Normal. No tenderness, snuff box tenderness or crepitus. Normal range of motion. Normal pulse.      Right hand: Swelling, tenderness and bony tenderness present. Decreased range of motion. Normal strength. Normal sensation. Normal capillary refill. Normal pulse.      Left hand: Normal.        Hands:       Cervical back: Full passive range of motion without pain, normal range of motion and neck supple.      Comments: Decreased active range of motion at the right 4th proximal phalanx.  Full passive range of motion noted with increase in pain to right 4th metatarsal.  There is tenderness to the right 4th metatarsal.  Sensation is intact distal cap refill within normal limits.      Neurological: She is alert and oriented to person, place, and time. She has normal strength. She displays normal reflexes. No sensory deficit. GCS score is 15. GCS eye subscore is 4. GCS verbal subscore is 5. GCS motor subscore is 6.   Skin: Skin is warm. Capillary refill takes less than 2 seconds.   Psychiatric: She has a normal mood and affect.       ED Procedures   Procedures    MDM  Differential Diagnoses   Based on available history, the working differential diagnoses considered during " this evaluation include but are not limited to sprain/strain, contusion, fracture, dislocation.      LABS     Labs Reviewed - No data to display        Notable findings from my independent interpretations of the labs (if ordered) include:       Imaging     Imaging Results              X-Ray Hand 3 view Right (Final result)  Result time 07/08/25 09:33:39      Final result by Rodrigue Godwin MD (07/08/25 09:33:39)                   Impression:      As above      Electronically signed by: Rodrigue Godwin MD  Date:    07/08/2025  Time:    09:33               Narrative:    EXAMINATION:  XR HAND COMPLETE 3 VIEW RIGHT    CLINICAL HISTORY:  fall;    TECHNIQUE:  PA, lateral, and oblique views of the right hand were performed.    COMPARISON:  None    FINDINGS:  There are changes of an old 5th metacarpal fracture and 4th distal phalanx fracture.  Mild degenerative changes of the visualized interphalangeal joints.  Negative for fracture or dislocation.  Negative for soft tissue abnormalities.                                                     EKG          ED Management/Discussion     Medications   traMADoL tablet 50 mg (50 mg Oral Given 7/8/25 0906)                    Patient/family has been counseled regarding findings of fracture.  An appropriate Ortho glass splint has been placed per nursing staff and a post splinting examination demonstrates a satisfactory neurovascular exam.  Patient has been instructed regarding regarding splint care along with the benefit of compresses, elevation of the affected extremity, and relative rest pending symptomatic improvement and follow-up.              On final assessment, the patient appears suitable for discharge.  I see no indication of an emergent process beyond that addressed during our encounter but have duly counseled the patient/family regarding the need for prompt follow-up as well as the indications that should prompt immediate return to the emergency room.  The patient/family has  been provided with language -specific verbal and printed direction regarding our final diagnosis(es) as well as instructions regarding use of OTC and/or Rx medications intended to manage the patient's aforementioned conditions including:  ED Prescriptions       Medication Sig Dispense Start Date End Date Auth. Provider    HYDROcodone-acetaminophen (NORCO) 5-325 mg per tablet Take 1 tablet by mouth every 6 (six) hours as needed for Pain. 12 tablet 7/8/2025 7/11/2025 Kat Shah NP              Patient has been advised of the following recommended follow-up instructions:  Follow-up Information       Follow up With Specialties Details Why Contact Info    [facility] - Emergency Dept. Emergency Medicine  If symptoms worsen [id number] [facility] [id number] [phone]    PCP  Schedule an appointment as soon as possible for a visit  for reassessment           The patient/family communicates understanding of all this information and all remaining questions and concerns were addressed at this time.      Referrals:  Orders Placed This Encounter   Procedures    Ambulatory referral/consult to Orthopedics     Standing Status:   Future     Expected Date:   7/15/2025     Expiration Date:   8/8/2026     Referral Priority:   Routine     Referral Type:   Consultation     Requested Specialty:   Orthopedic Surgery     Number of Visits Requested:   1             CLINICAL IMPRESSION    ICD-10-CM ICD-9-CM   1. Hand pain, right  M79.641 729.5   2. Closed nondisplaced fracture of fifth metatarsal bone of right foot, initial encounter  S92.354A 825.25   3. Closed nondisplaced fracture of distal phalanx of right ring finger, initial encounter  S62.664A 816.02        ED Disposition Condition    Discharge Stable            Social Drivers of Health     Tobacco Use: High Risk (7/3/2025)    Received from [facility]    Patient History     Smoking Tobacco Use: Every Day     Smokeless Tobacco Use: Never      Passive Exposure: Current   Alcohol Use: Not At Risk (5/15/2025)    Received from [facility]    AUDIT-C     Frequency of Alcohol Consumption: Never     Average Number of Drinks: Patient does not drink     Frequency of Binge Drinking: Never   Financial Resource Strain: Medium Risk (5/15/2025)    Received from [facility]    Overall Financial Resource Strain (CARDIA)     Difficulty of Paying Living Expenses: Somewhat hard   Food Insecurity: Food Insecurity Present (5/15/2025)    Received from [facility]    Hunger Vital Sign     Worried About Running Out of Food in the Last Year: Sometimes true     Ran Out of Food in the Last Year: Sometimes true   Transportation Needs: High Risk (5/29/2025)    Received from [facility] SDOH Screening     Has lack of transportation kept you from medical appointments, meetings, work or from getting things needed for daily living? choose all that apply.: Yes, it has kept me from medical appointments or from getting my medications   Physical Activity: Insufficiently Active (5/15/2025)    Received from [facility]    Exercise Vital Sign     Days of Exercise per Week: 1 day     Minutes of Exercise per Session: 10 min   Stress: Stress Concern Present (5/15/2025)    Received from [facility]    [employer] [place] [employer] - Occupational Stress Questionnaire     Feeling of Stress : Very much   Housing Stability: Unknown (5/15/2025)    Received from [facility]    Housing Stability Vital Sign     Unable to Pay for Housing in the Last Year: No     Number of Times Moved in the Last Year: Not on file     Homeless in the Last Year: Not on file   Depression: Not at risk (5/30/2025)    Received from [facility]    PHQ-2     PHQ-2 Score: 0   Utilities: Not At Risk (5/15/2025)    Received from [facility] Utilities     Threatened with loss of utilities: No   Health Literacy: Not on file   Social  Isolation: Not on file             [1]   Social History  Tobacco Use    Smoking status: Every Day     Current packs/day: 0.25     Types: Cigarettes    Smokeless tobacco: Never   Substance Use Topics    Alcohol use: No    Drug use: No        Kat Shah NP  07/08/25 1019

## 2025-07-11 ENCOUNTER — OFFICE VISIT (OUTPATIENT)
Dept: ORTHOPEDICS | Facility: CLINIC | Age: 55
End: 2025-07-11
Payer: MEDICAID

## 2025-07-11 VITALS — WEIGHT: 218.25 LBS | HEIGHT: 70 IN | BODY MASS INDEX: 31.25 KG/M2

## 2025-07-11 DIAGNOSIS — S69.91XA INJURY OF RIGHT HAND, INITIAL ENCOUNTER: Primary | ICD-10-CM

## 2025-07-11 DIAGNOSIS — M79.641 HAND PAIN, RIGHT: ICD-10-CM

## 2025-07-11 PROCEDURE — 97760 ORTHOTIC MGMT&TRAING 1ST ENC: CPT | Mod: ,,, | Performed by: STUDENT IN AN ORGANIZED HEALTH CARE EDUCATION/TRAINING PROGRAM

## 2025-07-11 PROCEDURE — 1160F RVW MEDS BY RX/DR IN RCRD: CPT | Mod: CPTII,,, | Performed by: STUDENT IN AN ORGANIZED HEALTH CARE EDUCATION/TRAINING PROGRAM

## 2025-07-11 PROCEDURE — 99204 OFFICE O/P NEW MOD 45 MIN: CPT | Mod: S$PBB,,, | Performed by: STUDENT IN AN ORGANIZED HEALTH CARE EDUCATION/TRAINING PROGRAM

## 2025-07-11 PROCEDURE — 3008F BODY MASS INDEX DOCD: CPT | Mod: CPTII,,, | Performed by: STUDENT IN AN ORGANIZED HEALTH CARE EDUCATION/TRAINING PROGRAM

## 2025-07-11 PROCEDURE — 99999 PR PBB SHADOW E&M-EST. PATIENT-LVL IV: CPT | Mod: PBBFAC,,, | Performed by: STUDENT IN AN ORGANIZED HEALTH CARE EDUCATION/TRAINING PROGRAM

## 2025-07-11 PROCEDURE — 99214 OFFICE O/P EST MOD 30 MIN: CPT | Mod: PBBFAC | Performed by: STUDENT IN AN ORGANIZED HEALTH CARE EDUCATION/TRAINING PROGRAM

## 2025-07-11 PROCEDURE — 1159F MED LIST DOCD IN RCRD: CPT | Mod: CPTII,,, | Performed by: STUDENT IN AN ORGANIZED HEALTH CARE EDUCATION/TRAINING PROGRAM

## 2025-07-11 RX ORDER — HYDROCODONE BITARTRATE AND ACETAMINOPHEN 5; 325 MG/1; MG/1
1 TABLET ORAL EVERY 6 HOURS PRN
Qty: 12 TABLET | Refills: 0 | Status: SHIPPED | OUTPATIENT
Start: 2025-07-11 | End: 2025-07-16
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
## 2025-07-16 ENCOUNTER — TELEPHONE (OUTPATIENT)
Dept: ORTHOPEDICS | Facility: CLINIC | Age: 55
End: 2025-07-16
Payer: MEDICAID

## 2025-07-16 RX ORDER — HYDROCODONE BITARTRATE AND ACETAMINOPHEN 5; 325 MG/1; MG/1
1 TABLET ORAL EVERY 6 HOURS PRN
Qty: 12 TABLET | Refills: 0 | Status: SHIPPED | OUTPATIENT
Start: 2025-07-16 | End: 2025-07-23

## 2025-07-17 ENCOUNTER — HOSPITAL ENCOUNTER (OUTPATIENT)
Dept: RADIOLOGY | Facility: HOSPITAL | Age: 55
Discharge: HOME | End: 2025-07-17
Attending: STUDENT IN AN ORGANIZED HEALTH CARE EDUCATION/TRAINING PROGRAM
Payer: MEDICAID

## 2025-07-17 ENCOUNTER — OFFICE VISIT (OUTPATIENT)
Dept: ORTHOPEDICS | Facility: CLINIC | Age: 55
End: 2025-07-17
Payer: MEDICAID

## 2025-07-17 VITALS — BODY MASS INDEX: 31.25 KG/M2 | HEIGHT: 70 IN | WEIGHT: 218.25 LBS

## 2025-07-17 DIAGNOSIS — M79.641 RIGHT HAND PAIN: Primary | ICD-10-CM

## 2025-07-17 DIAGNOSIS — S69.91XD INJURY OF RIGHT HAND, SUBSEQUENT ENCOUNTER: Primary | ICD-10-CM

## 2025-07-17 DIAGNOSIS — M79.641 HAND PAIN, RIGHT: ICD-10-CM

## 2025-07-17 PROCEDURE — 1159F MED LIST DOCD IN RCRD: CPT | Mod: CPTII,,, | Performed by: STUDENT IN AN ORGANIZED HEALTH CARE EDUCATION/TRAINING PROGRAM

## 2025-07-17 PROCEDURE — 3008F BODY MASS INDEX DOCD: CPT | Mod: CPTII,,, | Performed by: STUDENT IN AN ORGANIZED HEALTH CARE EDUCATION/TRAINING PROGRAM

## 2025-07-17 PROCEDURE — 99999 PR PBB SHADOW E&M-EST. PATIENT-LVL III: CPT | Mod: PBBFAC,,, | Performed by: STUDENT IN AN ORGANIZED HEALTH CARE EDUCATION/TRAINING PROGRAM

## 2025-07-17 PROCEDURE — 1160F RVW MEDS BY RX/DR IN RCRD: CPT | Mod: CPTII,,, | Performed by: STUDENT IN AN ORGANIZED HEALTH CARE EDUCATION/TRAINING PROGRAM

## 2025-07-17 PROCEDURE — 99214 OFFICE O/P EST MOD 30 MIN: CPT | Mod: S$PBB,,, | Performed by: STUDENT IN AN ORGANIZED HEALTH CARE EDUCATION/TRAINING PROGRAM

## 2025-07-17 PROCEDURE — 99213 OFFICE O/P EST LOW 20 MIN: CPT | Mod: PBBFAC,25 | Performed by: STUDENT IN AN ORGANIZED HEALTH CARE EDUCATION/TRAINING PROGRAM

## 2025-07-17 PROCEDURE — 73200 CT UPPER EXTREMITY W/O DYE: CPT | Mod: 26,RT,, | Performed by: RADIOLOGY

## 2025-07-17 PROCEDURE — 73200 CT UPPER EXTREMITY W/O DYE: CPT | Mod: TC,RT

## 2025-07-17 NOTE — PROGRESS NOTES
Hand Surgery Clinic Follow Up Note    Chief Complaint  Chief Complaint   Patient presents with    Right Hand - Injury, Numbness, Pain     CT review        History of Present Illness  55-year-old right-hand dominant female presents for follow up evaluation.  She sustained a trip and fall on 07/06/2025, 11 days ago.  She was last seen in clinic on 07/11/2025.  I ordered a CT scan to evaluate for possible fracture given the amount of pain and swelling that has noted.  Patient is here to review the CT scan results.  She has continued pain.  Her pain level is an 8/10.  She had issues picking up the Norco script which was prescribed at our last visit so I sent a new script to a different pharmacy.  We confirmed with the initial pharmacy we sent the script to that they do not have the medication.  She has been intermittently wearing a wrist brace.  It is currently in her purse today.  She has a history of diabetes with a most recent hemoglobin A1c of 10.8 on 07/03/2025.    Review of Systems  Review of systems negative for chest pain, shortness of breath, fevers, chills, nausea/vomiting.    Vital Signs  There were no vitals filed for this visit.    Physical Exam  Constitutional: Appears well-developed and well-nourished. No distress.   HENT:   Head: Normocephalic.   Eyes: EOM are normal.   Pulmonary/Chest: Effort normal.   Neurological: Oriented to person, place, and time.   Psychiatric: Normal mood and affect.     Right Upper Extremity:  No abrasions, lacerations, wounds.  Mild generalized swelling is noted at the hand, worst dorsally.  Patient has tenderness over the 2nd and 3rd metacarpals though this pain/tenderness is improved compared to her last visit.  She is unable to flex the ring and small fingers due to an old flexor tendon laceration injury for many years ago.  Compartments are soft and compressible throughout the hand.  No ecchymosis.  Patient is able to demonstrate gentle flexion and extension at the thumb,  index, and middle fingers.  Two-point discrimination is 5 mm in all 5 fingers.  No tenderness over the distal radius or distal ulna.  Active wrist flexion to 50° and extension to 40°.  Full pronation and supination. No erythema. No drainage. Palpable radial pulse. No wrist effusion. No snuffbox tenderness.     Imaging  CT of the right-hand without contrast was obtained today and independently reviewed by myself.  No new fracture noted.  No dislocation or subluxation.  Patient has old healed fractures to the 4th and 5th metacarpals.    Assessment and Plan  55-year-old female with a uncontrolled diabetes presents for follow up evaluation.  She sustained a right hand injury on 07/06/2025.  There is no evidence of fracture or dislocation on imaging today.  This is likely a sprain that we will continue to improve with time.  I discussed that she should remove the wrist brace when it home to work on range of motion exercises.  Can continue to wear the brace when out in public to protect the hand.  She can gradually progress to weight-bearing as tolerated.  Follow up in clinic in 2 weeks for re-evaluation with repeat x-rays of the right-hand.    Trudy Peng MD  Orthopaedic Hand Surgery

## 2025-07-31 ENCOUNTER — OFFICE VISIT (OUTPATIENT)
Dept: ORTHOPEDICS | Facility: CLINIC | Age: 55
End: 2025-07-31
Payer: MEDICAID

## 2025-07-31 ENCOUNTER — HOSPITAL ENCOUNTER (OUTPATIENT)
Dept: RADIOLOGY | Facility: HOSPITAL | Age: 55
Discharge: HOME | End: 2025-07-31
Attending: STUDENT IN AN ORGANIZED HEALTH CARE EDUCATION/TRAINING PROGRAM
Payer: MEDICAID

## 2025-07-31 DIAGNOSIS — S69.91XD INJURY OF RIGHT HAND, SUBSEQUENT ENCOUNTER: Primary | ICD-10-CM

## 2025-07-31 DIAGNOSIS — M79.641 RIGHT HAND PAIN: ICD-10-CM

## 2025-07-31 PROCEDURE — 1159F MED LIST DOCD IN RCRD: CPT | Mod: CPTII,,, | Performed by: STUDENT IN AN ORGANIZED HEALTH CARE EDUCATION/TRAINING PROGRAM

## 2025-07-31 PROCEDURE — 99214 OFFICE O/P EST MOD 30 MIN: CPT | Mod: S$PBB,,, | Performed by: STUDENT IN AN ORGANIZED HEALTH CARE EDUCATION/TRAINING PROGRAM

## 2025-07-31 PROCEDURE — 73130 X-RAY EXAM OF HAND: CPT | Mod: TC,RT

## 2025-07-31 PROCEDURE — 99999 PR PBB SHADOW E&M-EST. PATIENT-LVL III: CPT | Mod: PBBFAC,,, | Performed by: STUDENT IN AN ORGANIZED HEALTH CARE EDUCATION/TRAINING PROGRAM

## 2025-07-31 PROCEDURE — 73130 X-RAY EXAM OF HAND: CPT | Mod: 26,RT,, | Performed by: RADIOLOGY

## 2025-07-31 PROCEDURE — 1160F RVW MEDS BY RX/DR IN RCRD: CPT | Mod: CPTII,,, | Performed by: STUDENT IN AN ORGANIZED HEALTH CARE EDUCATION/TRAINING PROGRAM

## 2025-07-31 PROCEDURE — 99213 OFFICE O/P EST LOW 20 MIN: CPT | Mod: PBBFAC,25 | Performed by: STUDENT IN AN ORGANIZED HEALTH CARE EDUCATION/TRAINING PROGRAM

## 2025-07-31 NOTE — PROGRESS NOTES
Hand Surgery Clinic Follow Up Note    Chief Complaint  Chief Complaint   Patient presents with    Right Hand - Injury, Numbness, Pain     CT review        History of Present Illness  History of Present Illness    HPI:  Ms. Harrison presents for follow-up of a hand injury.  Injury occurred on 07/06/2025, 3 weeks and 4 days ago.   Her hand is improving and she is able to start using it. There is mild throbbing pain, particularly when her hand is impacted, localized to a specific area. She has resumed most activities and no longer uses a brace, reporting increased comfort. Pain is experienced in the affected area when making a fist and extending fingers.    She reports initial presentation with hand swelling. A CT was performed due to the swelling, which came back normal, suggesting a sprain rather than a new fracture.    Of note, patient has chronic flexor tendon injuries to the ring and small fingers in his unable to flex these fingers at baseline.         Review of Systems  Review of systems negative for chest pain, shortness of breath, fevers, chills, nausea/vomiting.    Vital Signs  There were no vitals filed for this visit.    Physical Exam  Constitutional: Appears well-developed and well-nourished. No distress.   HENT:   Head: Normocephalic.   Eyes: EOM are normal.   Pulmonary/Chest: Effort normal.   Neurological: Oriented to person, place, and time.   Psychiatric: Normal mood and affect.     Right Upper Extremity:  No abrasions, lacerations, wounds.  Swelling is significantly improved compared to patient's last visit.  There is a minimal swelling located over the dorsal hand just radial to the 4th metacarpal neck.  Patient no longer has tenderness throughout the hand.  She is unable to flex the ring and small fingers due to an old flexor tendon laceration injury for many years ago.  Compartments are soft and compressible throughout the hand.  No ecchymosis.  Patient is able to demonstrate gentle flexion and  extension at the thumb, index, and middle fingers.  Two-point discrimination is 5 mm in all 5 fingers.  No tenderness over the distal radius or distal ulna.  Active wrist flexion to 70° and extension to 60°.  Full pronation and supination. No erythema. No drainage. Palpable radial pulse. No wrist effusion. No snuffbox tenderness.     Imaging  Right-hand x-rays three views were obtained today and independently reviewed by myself.  No new fracture is noted.  Patient has old healed fractures of the 4th and 5th metacarpals.  No dislocation or subluxation.  No foreign body.    Assessment and Plan  55-year-old female presents for follow up evaluation.  She has a history of uncontrolled diabetes.  She sustained a right hand injury on 07/06/2025, 3 weeks and 4 days ago.  No evidence of fracture on imaging.  This is likely a sprain of her hand.  She has returned to full activities at this point and has intermittent mild throbbing pain.  This should improve with time.  The swelling should also continue to improve with time.  She is no longer requiring the wrist brace.  She has no activity restrictions.  She can follow up in hand surgery clinic on an as-needed basis.        This note was generated with the assistance of ambient listening technology. Verbal consent was obtained by the patient and accompanying visitor(s) for the recording of patient appointment to facilitate this note. I attest to having reviewed and edited the generated note for accuracy, though some syntax or spelling errors may persist. Please contact the author of this note for any clarification.       Trudy Peng MD  Orthopaedic Hand Surgery